# Patient Record
Sex: FEMALE | Race: WHITE | NOT HISPANIC OR LATINO | Employment: OTHER | URBAN - METROPOLITAN AREA
[De-identification: names, ages, dates, MRNs, and addresses within clinical notes are randomized per-mention and may not be internally consistent; named-entity substitution may affect disease eponyms.]

---

## 2021-08-18 DIAGNOSIS — Z11.59 ENCOUNTER FOR SCREENING FOR OTHER VIRAL DISEASES: ICD-10-CM

## 2021-08-26 ENCOUNTER — TRANSFERRED RECORDS (OUTPATIENT)
Dept: HEALTH INFORMATION MANAGEMENT | Facility: CLINIC | Age: 76
End: 2021-08-26

## 2021-08-26 LAB
CHOLESTEROL (EXTERNAL): 170 MG/DL (ref 0–199)
CREATININE (EXTERNAL): 0.79 MG/DL (ref 0.55–1.02)
GFR ESTIMATED (EXTERNAL): >60 ML/MIN/1.73M2
GLUCOSE (EXTERNAL): 100 MG/DL (ref 70–100)
HBA1C MFR BLD: 5.9 %
HDLC SERPL-MCNC: 66 MG/DL
LDL CHOLESTEROL (EXTERNAL): 85 MG/DL
NON HDL CHOLESTEROL (EXTERNAL): 104 MG/DL
POTASSIUM (EXTERNAL): 3.7 MMOL/L (ref 3.5–5.1)
TRIGLYCERIDES (EXTERNAL): 95 MG/DL

## 2021-08-30 ENCOUNTER — ANESTHESIA EVENT (OUTPATIENT)
Dept: SURGERY | Facility: CLINIC | Age: 76
DRG: 164 | End: 2021-08-30
Payer: COMMERCIAL

## 2021-08-30 RX ORDER — ATORVASTATIN CALCIUM 80 MG/1
80 TABLET, FILM COATED ORAL DAILY
COMMUNITY

## 2021-08-30 RX ORDER — ALBUTEROL SULFATE 90 UG/1
2 AEROSOL, METERED RESPIRATORY (INHALATION) EVERY 6 HOURS
COMMUNITY

## 2021-08-30 RX ORDER — TRAZODONE HYDROCHLORIDE 150 MG/1
150 TABLET ORAL AT BEDTIME
COMMUNITY

## 2021-08-30 RX ORDER — ALENDRONATE SODIUM 70 MG/1
70 TABLET ORAL
Status: ON HOLD | COMMUNITY
End: 2021-08-31

## 2021-08-30 RX ORDER — BUPROPION HYDROCHLORIDE 150 MG/1
150 TABLET ORAL EVERY MORNING
COMMUNITY

## 2021-08-30 ASSESSMENT — COPD QUESTIONNAIRES: COPD: 1

## 2021-08-30 ASSESSMENT — LIFESTYLE VARIABLES: TOBACCO_USE: 1

## 2021-08-31 ENCOUNTER — ANESTHESIA (OUTPATIENT)
Dept: SURGERY | Facility: CLINIC | Age: 76
DRG: 164 | End: 2021-08-31
Payer: COMMERCIAL

## 2021-08-31 ENCOUNTER — APPOINTMENT (OUTPATIENT)
Dept: GENERAL RADIOLOGY | Facility: CLINIC | Age: 76
DRG: 164 | End: 2021-08-31
Attending: THORACIC SURGERY (CARDIOTHORACIC VASCULAR SURGERY)
Payer: COMMERCIAL

## 2021-08-31 ENCOUNTER — HOSPITAL ENCOUNTER (INPATIENT)
Facility: CLINIC | Age: 76
LOS: 16 days | Discharge: HOME | DRG: 164 | End: 2021-09-16
Attending: THORACIC SURGERY (CARDIOTHORACIC VASCULAR SURGERY) | Admitting: THORACIC SURGERY (CARDIOTHORACIC VASCULAR SURGERY)
Payer: COMMERCIAL

## 2021-08-31 DIAGNOSIS — G89.18 ACUTE POST-OPERATIVE PAIN: Primary | ICD-10-CM

## 2021-08-31 DIAGNOSIS — K59.03 DRUG-INDUCED CONSTIPATION: ICD-10-CM

## 2021-08-31 DIAGNOSIS — D75.839 THROMBOCYTOSIS: ICD-10-CM

## 2021-08-31 PROBLEM — C34.31 PRIMARY ADENOCARCINOMA OF LOWER LOBE OF RIGHT LUNG (H): Status: ACTIVE | Noted: 2021-08-31

## 2021-08-31 LAB
ABO/RH(D): NORMAL
ALBUMIN SERPL-MCNC: 3.8 G/DL (ref 3.4–5)
ALP SERPL-CCNC: 22 U/L (ref 40–150)
ALT SERPL W P-5'-P-CCNC: 23 U/L (ref 0–50)
ANION GAP SERPL CALCULATED.3IONS-SCNC: 3 MMOL/L (ref 3–14)
ANION GAP SERPL CALCULATED.3IONS-SCNC: 5 MMOL/L (ref 3–14)
ANTIBODY SCREEN: NEGATIVE
AST SERPL W P-5'-P-CCNC: 18 U/L (ref 0–45)
BASOPHILS # BLD AUTO: 0.1 10E3/UL (ref 0–0.2)
BASOPHILS NFR BLD AUTO: 0 %
BILIRUB SERPL-MCNC: 0.3 MG/DL (ref 0.2–1.3)
BUN SERPL-MCNC: 18 MG/DL (ref 7–30)
BUN SERPL-MCNC: 21 MG/DL (ref 7–30)
CALCIUM SERPL-MCNC: 8.3 MG/DL (ref 8.5–10.1)
CALCIUM SERPL-MCNC: 8.6 MG/DL (ref 8.5–10.1)
CHLORIDE BLD-SCNC: 108 MMOL/L (ref 94–109)
CHLORIDE BLD-SCNC: 108 MMOL/L (ref 94–109)
CO2 SERPL-SCNC: 26 MMOL/L (ref 20–32)
CO2 SERPL-SCNC: 26 MMOL/L (ref 20–32)
CREAT SERPL-MCNC: 0.72 MG/DL (ref 0.52–1.04)
CREAT SERPL-MCNC: 0.78 MG/DL (ref 0.52–1.04)
CREAT SERPL-MCNC: 0.85 MG/DL (ref 0.52–1.04)
EOSINOPHIL # BLD AUTO: 0 10E3/UL (ref 0–0.7)
EOSINOPHIL NFR BLD AUTO: 0 %
ERYTHROCYTE [DISTWIDTH] IN BLOOD BY AUTOMATED COUNT: 13.9 % (ref 10–15)
GFR SERPL CREATININE-BSD FRML MDRD: 68 ML/MIN/1.73M2
GFR SERPL CREATININE-BSD FRML MDRD: 75 ML/MIN/1.73M2
GFR SERPL CREATININE-BSD FRML MDRD: 83 ML/MIN/1.73M2
GLUCOSE BLD-MCNC: 100 MG/DL (ref 70–99)
GLUCOSE BLD-MCNC: 130 MG/DL (ref 70–99)
HCT VFR BLD AUTO: 38.5 % (ref 35–47)
HCT VFR BLD AUTO: 42.6 % (ref 35–47)
HCT VFR BLD AUTO: 43 % (ref 35–47)
HGB BLD-MCNC: 12.8 G/DL (ref 11.7–15.7)
HGB BLD-MCNC: 13.9 G/DL (ref 11.7–15.7)
HGB BLD-MCNC: 14 G/DL (ref 11.7–15.7)
HGB BLD-MCNC: 14 G/DL (ref 11.7–15.7)
IMM GRANULOCYTES # BLD: 0.2 10E3/UL
IMM GRANULOCYTES NFR BLD: 1 %
INR PPP: 1.01 (ref 0.85–1.15)
LYMPHOCYTES # BLD AUTO: 0.6 10E3/UL (ref 0.8–5.3)
LYMPHOCYTES NFR BLD AUTO: 3 %
MCH RBC QN AUTO: 30.7 PG (ref 26.5–33)
MCH RBC QN AUTO: 30.8 PG (ref 26.5–33)
MCH RBC QN AUTO: 31.2 PG (ref 26.5–33)
MCHC RBC AUTO-ENTMCNC: 32.6 G/DL (ref 31.5–36.5)
MCHC RBC AUTO-ENTMCNC: 32.6 G/DL (ref 31.5–36.5)
MCHC RBC AUTO-ENTMCNC: 33.2 G/DL (ref 31.5–36.5)
MCV RBC AUTO: 94 FL (ref 78–100)
MONOCYTES # BLD AUTO: 1.2 10E3/UL (ref 0–1.3)
MONOCYTES NFR BLD AUTO: 5 %
NEUTROPHILS # BLD AUTO: 19.9 10E3/UL (ref 1.6–8.3)
NEUTROPHILS NFR BLD AUTO: 91 %
NRBC # BLD AUTO: 0 10E3/UL
NRBC BLD AUTO-RTO: 0 /100
PLATELET # BLD AUTO: 442 10E3/UL (ref 150–450)
PLATELET # BLD AUTO: 475 10E3/UL (ref 150–450)
PLATELET # BLD AUTO: 485 10E3/UL (ref 150–450)
POTASSIUM BLD-SCNC: 3.6 MMOL/L (ref 3.4–5.3)
POTASSIUM BLD-SCNC: 4 MMOL/L (ref 3.4–5.3)
PROT SERPL-MCNC: 6.5 G/DL (ref 6.8–8.8)
RBC # BLD AUTO: 4.1 10E6/UL (ref 3.8–5.2)
RBC # BLD AUTO: 4.52 10E6/UL (ref 3.8–5.2)
RBC # BLD AUTO: 4.56 10E6/UL (ref 3.8–5.2)
SODIUM SERPL-SCNC: 137 MMOL/L (ref 133–144)
SODIUM SERPL-SCNC: 139 MMOL/L (ref 133–144)
SPECIMEN EXPIRATION DATE: NORMAL
WBC # BLD AUTO: 21.9 10E3/UL (ref 4–11)
WBC # BLD AUTO: 8.1 10E3/UL (ref 4–11)
WBC # BLD AUTO: ABNORMAL 10*3/UL

## 2021-08-31 PROCEDURE — 258N000003 HC RX IP 258 OP 636: Performed by: SURGERY

## 2021-08-31 PROCEDURE — 36415 COLL VENOUS BLD VENIPUNCTURE: CPT | Performed by: PHYSICIAN ASSISTANT

## 2021-08-31 PROCEDURE — 250N000009 HC RX 250: Performed by: SURGERY

## 2021-08-31 PROCEDURE — 250N000025 HC SEVOFLURANE, PER MIN: Performed by: THORACIC SURGERY (CARDIOTHORACIC VASCULAR SURGERY)

## 2021-08-31 PROCEDURE — 258N000003 HC RX IP 258 OP 636: Performed by: PHYSICIAN ASSISTANT

## 2021-08-31 PROCEDURE — 360N000077 HC SURGERY LEVEL 4, PER MIN: Performed by: THORACIC SURGERY (CARDIOTHORACIC VASCULAR SURGERY)

## 2021-08-31 PROCEDURE — 82565 ASSAY OF CREATININE: CPT | Performed by: PHYSICIAN ASSISTANT

## 2021-08-31 PROCEDURE — 99207 PR CONSULT E&M CHANGED TO INITIAL LEVEL: CPT | Performed by: PHYSICIAN ASSISTANT

## 2021-08-31 PROCEDURE — 999N000063 XR CHEST PORT 1 VIEW

## 2021-08-31 PROCEDURE — 258N000003 HC RX IP 258 OP 636: Performed by: ANESTHESIOLOGY

## 2021-08-31 PROCEDURE — 250N000009 HC RX 250: Performed by: ANESTHESIOLOGY

## 2021-08-31 PROCEDURE — 85025 COMPLETE CBC W/AUTO DIFF WBC: CPT | Performed by: THORACIC SURGERY (CARDIOTHORACIC VASCULAR SURGERY)

## 2021-08-31 PROCEDURE — 250N000011 HC RX IP 250 OP 636: Performed by: PHYSICIAN ASSISTANT

## 2021-08-31 PROCEDURE — 250N000013 HC RX MED GY IP 250 OP 250 PS 637: Performed by: PHYSICIAN ASSISTANT

## 2021-08-31 PROCEDURE — 999N000141 HC STATISTIC PRE-PROCEDURE NURSING ASSESSMENT: Performed by: THORACIC SURGERY (CARDIOTHORACIC VASCULAR SURGERY)

## 2021-08-31 PROCEDURE — 07B70ZX EXCISION OF THORAX LYMPHATIC, OPEN APPROACH, DIAGNOSTIC: ICD-10-PCS | Performed by: THORACIC SURGERY (CARDIOTHORACIC VASCULAR SURGERY)

## 2021-08-31 PROCEDURE — 36415 COLL VENOUS BLD VENIPUNCTURE: CPT | Performed by: THORACIC SURGERY (CARDIOTHORACIC VASCULAR SURGERY)

## 2021-08-31 PROCEDURE — 85018 HEMOGLOBIN: CPT | Performed by: PHYSICIAN ASSISTANT

## 2021-08-31 PROCEDURE — 99222 1ST HOSP IP/OBS MODERATE 55: CPT | Performed by: PHYSICIAN ASSISTANT

## 2021-08-31 PROCEDURE — 710N000009 HC RECOVERY PHASE 1, LEVEL 1, PER MIN: Performed by: THORACIC SURGERY (CARDIOTHORACIC VASCULAR SURGERY)

## 2021-08-31 PROCEDURE — 120N000001 HC R&B MED SURG/OB

## 2021-08-31 PROCEDURE — 250N000011 HC RX IP 250 OP 636: Performed by: SURGERY

## 2021-08-31 PROCEDURE — 85610 PROTHROMBIN TIME: CPT | Performed by: THORACIC SURGERY (CARDIOTHORACIC VASCULAR SURGERY)

## 2021-08-31 PROCEDURE — 250N000011 HC RX IP 250 OP 636: Performed by: THORACIC SURGERY (CARDIOTHORACIC VASCULAR SURGERY)

## 2021-08-31 PROCEDURE — 80048 BASIC METABOLIC PNL TOTAL CA: CPT | Performed by: THORACIC SURGERY (CARDIOTHORACIC VASCULAR SURGERY)

## 2021-08-31 PROCEDURE — 250N000013 HC RX MED GY IP 250 OP 250 PS 637

## 2021-08-31 PROCEDURE — 86900 BLOOD TYPING SEROLOGIC ABO: CPT | Performed by: ANESTHESIOLOGY

## 2021-08-31 PROCEDURE — 88309 TISSUE EXAM BY PATHOLOGIST: CPT | Mod: TC | Performed by: THORACIC SURGERY (CARDIOTHORACIC VASCULAR SURGERY)

## 2021-08-31 PROCEDURE — 278N000051 HC OR IMPLANT GENERAL: Performed by: THORACIC SURGERY (CARDIOTHORACIC VASCULAR SURGERY)

## 2021-08-31 PROCEDURE — 85049 AUTOMATED PLATELET COUNT: CPT | Performed by: THORACIC SURGERY (CARDIOTHORACIC VASCULAR SURGERY)

## 2021-08-31 PROCEDURE — XW0DXT5 INTRODUCTION OF RUXOLITINIB INTO MOUTH AND PHARYNX, EXTERNAL APPROACH, NEW TECHNOLOGY GROUP 5: ICD-10-PCS | Performed by: THORACIC SURGERY (CARDIOTHORACIC VASCULAR SURGERY)

## 2021-08-31 PROCEDURE — 370N000017 HC ANESTHESIA TECHNICAL FEE, PER MIN: Performed by: THORACIC SURGERY (CARDIOTHORACIC VASCULAR SURGERY)

## 2021-08-31 PROCEDURE — 272N000001 HC OR GENERAL SUPPLY STERILE: Performed by: THORACIC SURGERY (CARDIOTHORACIC VASCULAR SURGERY)

## 2021-08-31 PROCEDURE — 250N000011 HC RX IP 250 OP 636

## 2021-08-31 PROCEDURE — 0BTF0ZZ RESECTION OF RIGHT LOWER LUNG LOBE, OPEN APPROACH: ICD-10-PCS | Performed by: THORACIC SURGERY (CARDIOTHORACIC VASCULAR SURGERY)

## 2021-08-31 PROCEDURE — 82565 ASSAY OF CREATININE: CPT | Performed by: THORACIC SURGERY (CARDIOTHORACIC VASCULAR SURGERY)

## 2021-08-31 PROCEDURE — 250N000009 HC RX 250: Performed by: THORACIC SURGERY (CARDIOTHORACIC VASCULAR SURGERY)

## 2021-08-31 PROCEDURE — 85041 AUTOMATED RBC COUNT: CPT | Performed by: THORACIC SURGERY (CARDIOTHORACIC VASCULAR SURGERY)

## 2021-08-31 PROCEDURE — 271N000002 HC RX 271: Performed by: THORACIC SURGERY (CARDIOTHORACIC VASCULAR SURGERY)

## 2021-08-31 DEVICE — SEALANT PLEURAL AIRLEAK PROGEL 4ML PGPS002: Type: IMPLANTABLE DEVICE | Site: LUNG | Status: FUNCTIONAL

## 2021-08-31 RX ORDER — DIPHENHYDRAMINE HYDROCHLORIDE 50 MG/ML
12.5 INJECTION INTRAMUSCULAR; INTRAVENOUS EVERY 6 HOURS PRN
Status: DISCONTINUED | OUTPATIENT
Start: 2021-08-31 | End: 2021-09-16 | Stop reason: HOSPADM

## 2021-08-31 RX ORDER — AMOXICILLIN 250 MG
1 CAPSULE ORAL 2 TIMES DAILY
Status: DISCONTINUED | OUTPATIENT
Start: 2021-08-31 | End: 2021-09-14

## 2021-08-31 RX ORDER — ONDANSETRON 4 MG/1
4 TABLET, ORALLY DISINTEGRATING ORAL EVERY 6 HOURS PRN
Status: DISCONTINUED | OUTPATIENT
Start: 2021-08-31 | End: 2021-09-16 | Stop reason: HOSPADM

## 2021-08-31 RX ORDER — LABETALOL HYDROCHLORIDE 5 MG/ML
10 INJECTION, SOLUTION INTRAVENOUS
Status: DISCONTINUED | OUTPATIENT
Start: 2021-08-31 | End: 2021-08-31 | Stop reason: HOSPADM

## 2021-08-31 RX ORDER — POLYETHYLENE GLYCOL 3350 17 G/17G
17 POWDER, FOR SOLUTION ORAL DAILY
Status: DISCONTINUED | OUTPATIENT
Start: 2021-09-01 | End: 2021-09-16 | Stop reason: HOSPADM

## 2021-08-31 RX ORDER — CALCIUM CARBONATE 500 MG/1
500 TABLET, CHEWABLE ORAL 4 TIMES DAILY PRN
Status: DISCONTINUED | OUTPATIENT
Start: 2021-08-31 | End: 2021-09-01

## 2021-08-31 RX ORDER — ACETAMINOPHEN 325 MG/1
650 TABLET ORAL EVERY 4 HOURS PRN
Status: DISCONTINUED | OUTPATIENT
Start: 2021-09-03 | End: 2021-09-01

## 2021-08-31 RX ORDER — SODIUM CHLORIDE, SODIUM LACTATE, POTASSIUM CHLORIDE, CALCIUM CHLORIDE 600; 310; 30; 20 MG/100ML; MG/100ML; MG/100ML; MG/100ML
INJECTION, SOLUTION INTRAVENOUS CONTINUOUS
Status: DISCONTINUED | OUTPATIENT
Start: 2021-08-31 | End: 2021-08-31 | Stop reason: HOSPADM

## 2021-08-31 RX ORDER — PROPOFOL 10 MG/ML
INJECTION, EMULSION INTRAVENOUS PRN
Status: DISCONTINUED | OUTPATIENT
Start: 2021-08-31 | End: 2021-08-31

## 2021-08-31 RX ORDER — LIDOCAINE 40 MG/G
CREAM TOPICAL
Status: DISCONTINUED | OUTPATIENT
Start: 2021-08-31 | End: 2021-09-02

## 2021-08-31 RX ORDER — ALBUTEROL SULFATE 90 UG/1
2 AEROSOL, METERED RESPIRATORY (INHALATION) EVERY 6 HOURS PRN
Status: DISCONTINUED | OUTPATIENT
Start: 2021-08-31 | End: 2021-09-16 | Stop reason: HOSPADM

## 2021-08-31 RX ORDER — LIDOCAINE HYDROCHLORIDE 20 MG/ML
INJECTION, SOLUTION INFILTRATION; PERINEURAL PRN
Status: DISCONTINUED | OUTPATIENT
Start: 2021-08-31 | End: 2021-08-31

## 2021-08-31 RX ORDER — PROPOFOL 10 MG/ML
INJECTION, EMULSION INTRAVENOUS CONTINUOUS PRN
Status: DISCONTINUED | OUTPATIENT
Start: 2021-08-31 | End: 2021-08-31

## 2021-08-31 RX ORDER — EPHEDRINE SULFATE 50 MG/ML
INJECTION, SOLUTION INTRAMUSCULAR; INTRAVENOUS; SUBCUTANEOUS PRN
Status: DISCONTINUED | OUTPATIENT
Start: 2021-08-31 | End: 2021-08-31

## 2021-08-31 RX ORDER — NALOXONE HYDROCHLORIDE 0.4 MG/ML
0.4 INJECTION, SOLUTION INTRAMUSCULAR; INTRAVENOUS; SUBCUTANEOUS
Status: DISCONTINUED | OUTPATIENT
Start: 2021-08-31 | End: 2021-09-16 | Stop reason: HOSPADM

## 2021-08-31 RX ORDER — OXYCODONE HYDROCHLORIDE 5 MG/1
5 TABLET ORAL EVERY 4 HOURS PRN
Status: DISCONTINUED | OUTPATIENT
Start: 2021-08-31 | End: 2021-08-31 | Stop reason: HOSPADM

## 2021-08-31 RX ORDER — GINSENG 100 MG
CAPSULE ORAL DAILY
Status: DISCONTINUED | OUTPATIENT
Start: 2021-08-31 | End: 2021-09-16 | Stop reason: HOSPADM

## 2021-08-31 RX ORDER — HYDRALAZINE HYDROCHLORIDE 20 MG/ML
2.5-5 INJECTION INTRAMUSCULAR; INTRAVENOUS EVERY 10 MIN PRN
Status: DISCONTINUED | OUTPATIENT
Start: 2021-08-31 | End: 2021-08-31 | Stop reason: HOSPADM

## 2021-08-31 RX ORDER — ACETAMINOPHEN 325 MG/1
975 TABLET ORAL EVERY 8 HOURS
Status: DISCONTINUED | OUTPATIENT
Start: 2021-08-31 | End: 2021-09-01

## 2021-08-31 RX ORDER — DIPHENHYDRAMINE HCL 12.5MG/5ML
12.5 LIQUID (ML) ORAL EVERY 6 HOURS PRN
Status: DISCONTINUED | OUTPATIENT
Start: 2021-08-31 | End: 2021-09-16 | Stop reason: HOSPADM

## 2021-08-31 RX ORDER — LIDOCAINE 40 MG/G
CREAM TOPICAL
Status: DISCONTINUED | OUTPATIENT
Start: 2021-08-31 | End: 2021-08-31

## 2021-08-31 RX ORDER — PROCHLORPERAZINE MALEATE 5 MG
5 TABLET ORAL EVERY 6 HOURS PRN
Status: DISCONTINUED | OUTPATIENT
Start: 2021-08-31 | End: 2021-09-16 | Stop reason: HOSPADM

## 2021-08-31 RX ORDER — ATORVASTATIN CALCIUM 40 MG/1
80 TABLET, FILM COATED ORAL EVERY EVENING
Status: DISCONTINUED | OUTPATIENT
Start: 2021-08-31 | End: 2021-09-16 | Stop reason: HOSPADM

## 2021-08-31 RX ORDER — ONDANSETRON 2 MG/ML
4 INJECTION INTRAMUSCULAR; INTRAVENOUS EVERY 30 MIN PRN
Status: DISCONTINUED | OUTPATIENT
Start: 2021-08-31 | End: 2021-08-31 | Stop reason: HOSPADM

## 2021-08-31 RX ORDER — NALOXONE HYDROCHLORIDE 0.4 MG/ML
0.2 INJECTION, SOLUTION INTRAMUSCULAR; INTRAVENOUS; SUBCUTANEOUS
Status: DISCONTINUED | OUTPATIENT
Start: 2021-08-31 | End: 2021-09-16 | Stop reason: HOSPADM

## 2021-08-31 RX ORDER — BUPIVACAINE HYDROCHLORIDE 5 MG/ML
INJECTION, SOLUTION PERINEURAL PRN
Status: DISCONTINUED | OUTPATIENT
Start: 2021-08-31 | End: 2021-08-31 | Stop reason: HOSPADM

## 2021-08-31 RX ORDER — ONDANSETRON 2 MG/ML
4 INJECTION INTRAMUSCULAR; INTRAVENOUS EVERY 6 HOURS PRN
Status: DISCONTINUED | OUTPATIENT
Start: 2021-08-31 | End: 2021-09-16 | Stop reason: HOSPADM

## 2021-08-31 RX ORDER — FENTANYL CITRATE 50 UG/ML
25 INJECTION, SOLUTION INTRAMUSCULAR; INTRAVENOUS EVERY 5 MIN PRN
Status: DISCONTINUED | OUTPATIENT
Start: 2021-08-31 | End: 2021-08-31 | Stop reason: HOSPADM

## 2021-08-31 RX ORDER — DEXAMETHASONE SODIUM PHOSPHATE 4 MG/ML
INJECTION, SOLUTION INTRA-ARTICULAR; INTRALESIONAL; INTRAMUSCULAR; INTRAVENOUS; SOFT TISSUE PRN
Status: DISCONTINUED | OUTPATIENT
Start: 2021-08-31 | End: 2021-08-31

## 2021-08-31 RX ORDER — CALCIUM CARBONATE 500 MG/1
500 TABLET, CHEWABLE ORAL 4 TIMES DAILY PRN
Status: DISCONTINUED | OUTPATIENT
Start: 2021-08-31 | End: 2021-09-16 | Stop reason: HOSPADM

## 2021-08-31 RX ORDER — HYDROMORPHONE HCL IN WATER/PF 6 MG/30 ML
0.4 PATIENT CONTROLLED ANALGESIA SYRINGE INTRAVENOUS EVERY 5 MIN PRN
Status: DISCONTINUED | OUTPATIENT
Start: 2021-08-31 | End: 2021-08-31 | Stop reason: HOSPADM

## 2021-08-31 RX ORDER — FAMOTIDINE 20 MG/1
20 TABLET, FILM COATED ORAL 2 TIMES DAILY
Status: DISCONTINUED | OUTPATIENT
Start: 2021-08-31 | End: 2021-09-02

## 2021-08-31 RX ORDER — CEFAZOLIN SODIUM 2 G/100ML
2 INJECTION, SOLUTION INTRAVENOUS SEE ADMIN INSTRUCTIONS
Status: DISCONTINUED | OUTPATIENT
Start: 2021-08-31 | End: 2021-08-31 | Stop reason: HOSPADM

## 2021-08-31 RX ORDER — NITROGLYCERIN 0.4 MG/1
0.4 TABLET SUBLINGUAL EVERY 5 MIN PRN
Status: DISCONTINUED | OUTPATIENT
Start: 2021-08-31 | End: 2021-09-02

## 2021-08-31 RX ORDER — BISACODYL 10 MG
10 SUPPOSITORY, RECTAL RECTAL DAILY PRN
Status: DISCONTINUED | OUTPATIENT
Start: 2021-08-31 | End: 2021-09-16 | Stop reason: HOSPADM

## 2021-08-31 RX ORDER — ONDANSETRON 2 MG/ML
INJECTION INTRAMUSCULAR; INTRAVENOUS PRN
Status: DISCONTINUED | OUTPATIENT
Start: 2021-08-31 | End: 2021-08-31

## 2021-08-31 RX ORDER — ALBUTEROL SULFATE 90 UG/1
AEROSOL, METERED RESPIRATORY (INHALATION) PRN
Status: DISCONTINUED | OUTPATIENT
Start: 2021-08-31 | End: 2021-08-31

## 2021-08-31 RX ORDER — MAGNESIUM HYDROXIDE 1200 MG/15ML
LIQUID ORAL PRN
Status: DISCONTINUED | OUTPATIENT
Start: 2021-08-31 | End: 2021-08-31 | Stop reason: HOSPADM

## 2021-08-31 RX ORDER — ONDANSETRON 4 MG/1
4 TABLET, ORALLY DISINTEGRATING ORAL EVERY 30 MIN PRN
Status: DISCONTINUED | OUTPATIENT
Start: 2021-08-31 | End: 2021-08-31 | Stop reason: HOSPADM

## 2021-08-31 RX ORDER — FENTANYL CITRATE 50 UG/ML
INJECTION, SOLUTION INTRAMUSCULAR; INTRAVENOUS PRN
Status: DISCONTINUED | OUTPATIENT
Start: 2021-08-31 | End: 2021-08-31

## 2021-08-31 RX ORDER — CEFAZOLIN SODIUM 2 G/100ML
2 INJECTION, SOLUTION INTRAVENOUS
Status: DISCONTINUED | OUTPATIENT
Start: 2021-08-31 | End: 2021-08-31 | Stop reason: HOSPADM

## 2021-08-31 RX ORDER — KETOROLAC TROMETHAMINE 30 MG/ML
INJECTION, SOLUTION INTRAMUSCULAR; INTRAVENOUS PRN
Status: DISCONTINUED | OUTPATIENT
Start: 2021-08-31 | End: 2021-08-31

## 2021-08-31 RX ORDER — HYDROMORPHONE HCL IN WATER/PF 6 MG/30 ML
.2-.3 PATIENT CONTROLLED ANALGESIA SYRINGE INTRAVENOUS
Status: DISCONTINUED | OUTPATIENT
Start: 2021-08-31 | End: 2021-09-01

## 2021-08-31 RX ORDER — SODIUM CHLORIDE 9 MG/ML
INJECTION, SOLUTION INTRAVENOUS CONTINUOUS
Status: DISCONTINUED | OUTPATIENT
Start: 2021-08-31 | End: 2021-09-02

## 2021-08-31 RX ORDER — BUPROPION HYDROCHLORIDE 150 MG/1
150 TABLET ORAL EVERY MORNING
Status: DISCONTINUED | OUTPATIENT
Start: 2021-08-31 | End: 2021-09-16 | Stop reason: HOSPADM

## 2021-08-31 RX ORDER — KETOROLAC TROMETHAMINE 15 MG/ML
15 INJECTION, SOLUTION INTRAMUSCULAR; INTRAVENOUS EVERY 6 HOURS
Status: COMPLETED | OUTPATIENT
Start: 2021-08-31 | End: 2021-09-03

## 2021-08-31 RX ORDER — HYDROMORPHONE HYDROCHLORIDE 2 MG/1
2 TABLET ORAL
Status: DISCONTINUED | OUTPATIENT
Start: 2021-08-31 | End: 2021-09-01

## 2021-08-31 RX ADMIN — KETOROLAC TROMETHAMINE 15 MG: 15 INJECTION, SOLUTION INTRAMUSCULAR; INTRAVENOUS at 14:14

## 2021-08-31 RX ADMIN — FAMOTIDINE 20 MG: 20 TABLET ORAL at 20:25

## 2021-08-31 RX ADMIN — UMECLIDINIUM BROMIDE AND VILANTEROL TRIFENATATE 1 PUFF: 62.5; 25 POWDER RESPIRATORY (INHALATION) at 14:14

## 2021-08-31 RX ADMIN — HYDROMORPHONE HYDROCHLORIDE 0.2 MG: 0.2 INJECTION, SOLUTION INTRAMUSCULAR; INTRAVENOUS; SUBCUTANEOUS at 13:11

## 2021-08-31 RX ADMIN — HYDROMORPHONE HYDROCHLORIDE 2 MG: 2 TABLET ORAL at 22:55

## 2021-08-31 RX ADMIN — DEXAMETHASONE SODIUM PHOSPHATE 4 MG: 4 INJECTION, SOLUTION INTRA-ARTICULAR; INTRALESIONAL; INTRAMUSCULAR; INTRAVENOUS; SOFT TISSUE at 08:26

## 2021-08-31 RX ADMIN — ALBUTEROL SULFATE 2 PUFF: 90 AEROSOL, METERED RESPIRATORY (INHALATION) at 07:21

## 2021-08-31 RX ADMIN — FENTANYL CITRATE 25 MCG: 50 INJECTION, SOLUTION INTRAMUSCULAR; INTRAVENOUS at 09:44

## 2021-08-31 RX ADMIN — SODIUM CHLORIDE, POTASSIUM CHLORIDE, SODIUM LACTATE AND CALCIUM CHLORIDE: 600; 310; 30; 20 INJECTION, SOLUTION INTRAVENOUS at 07:26

## 2021-08-31 RX ADMIN — ONDANSETRON 4 MG: 2 INJECTION INTRAMUSCULAR; INTRAVENOUS at 09:16

## 2021-08-31 RX ADMIN — FENTANYL CITRATE 50 MCG: 50 INJECTION, SOLUTION INTRAMUSCULAR; INTRAVENOUS at 08:29

## 2021-08-31 RX ADMIN — FENTANYL CITRATE 50 MCG: 50 INJECTION, SOLUTION INTRAMUSCULAR; INTRAVENOUS at 08:05

## 2021-08-31 RX ADMIN — Medication 2.5 MG: at 09:06

## 2021-08-31 RX ADMIN — ROCURONIUM BROMIDE 50 MG: 10 INJECTION INTRAVENOUS at 07:57

## 2021-08-31 RX ADMIN — MIDAZOLAM 2 MG: 1 INJECTION INTRAMUSCULAR; INTRAVENOUS at 07:47

## 2021-08-31 RX ADMIN — SODIUM CHLORIDE, POTASSIUM CHLORIDE, SODIUM LACTATE AND CALCIUM CHLORIDE: 600; 310; 30; 20 INJECTION, SOLUTION INTRAVENOUS at 10:12

## 2021-08-31 RX ADMIN — Medication: at 10:13

## 2021-08-31 RX ADMIN — LIDOCAINE HYDROCHLORIDE 1 ML: 10 INJECTION, SOLUTION EPIDURAL; INFILTRATION; INTRACAUDAL; PERINEURAL at 07:27

## 2021-08-31 RX ADMIN — ACETAMINOPHEN 975 MG: 325 TABLET, FILM COATED ORAL at 20:25

## 2021-08-31 RX ADMIN — KETOROLAC TROMETHAMINE 15 MG: 30 INJECTION, SOLUTION INTRAMUSCULAR at 09:23

## 2021-08-31 RX ADMIN — FENTANYL CITRATE 25 MCG: 50 INJECTION, SOLUTION INTRAMUSCULAR; INTRAVENOUS at 09:51

## 2021-08-31 RX ADMIN — KETOROLAC TROMETHAMINE 15 MG: 15 INJECTION, SOLUTION INTRAMUSCULAR; INTRAVENOUS at 20:24

## 2021-08-31 RX ADMIN — ATORVASTATIN CALCIUM 80 MG: 40 TABLET, FILM COATED ORAL at 20:25

## 2021-08-31 RX ADMIN — Medication 2.5 MG: at 09:20

## 2021-08-31 RX ADMIN — HYDROMORPHONE HYDROCHLORIDE 0.2 MG: 0.2 INJECTION, SOLUTION INTRAMUSCULAR; INTRAVENOUS; SUBCUTANEOUS at 22:15

## 2021-08-31 RX ADMIN — HYDROMORPHONE HYDROCHLORIDE 0.4 MG: 0.2 INJECTION, SOLUTION INTRAMUSCULAR; INTRAVENOUS; SUBCUTANEOUS at 09:59

## 2021-08-31 RX ADMIN — LIDOCAINE HYDROCHLORIDE 80 MG: 20 INJECTION, SOLUTION INFILTRATION; PERINEURAL at 07:57

## 2021-08-31 RX ADMIN — HYDROMORPHONE HYDROCHLORIDE 1 MG: 2 TABLET ORAL at 16:06

## 2021-08-31 RX ADMIN — FENTANYL CITRATE 50 MCG: 50 INJECTION, SOLUTION INTRAMUSCULAR; INTRAVENOUS at 07:57

## 2021-08-31 RX ADMIN — BUPROPION HYDROCHLORIDE 150 MG: 150 TABLET, FILM COATED, EXTENDED RELEASE ORAL at 13:11

## 2021-08-31 RX ADMIN — CEFAZOLIN SODIUM 2 G: 2 INJECTION, SOLUTION INTRAVENOUS at 08:00

## 2021-08-31 RX ADMIN — SENNOSIDES AND DOCUSATE SODIUM 1 TABLET: 8.6; 5 TABLET ORAL at 20:26

## 2021-08-31 RX ADMIN — HYDROMORPHONE HYDROCHLORIDE 0.4 MG: 0.2 INJECTION, SOLUTION INTRAMUSCULAR; INTRAVENOUS; SUBCUTANEOUS at 10:20

## 2021-08-31 RX ADMIN — FENTANYL CITRATE 50 MCG: 50 INJECTION, SOLUTION INTRAMUSCULAR; INTRAVENOUS at 08:50

## 2021-08-31 RX ADMIN — SUGAMMADEX 100 MG: 100 INJECTION, SOLUTION INTRAVENOUS at 09:28

## 2021-08-31 RX ADMIN — PROPOFOL 30 MCG/KG/MIN: 10 INJECTION, EMULSION INTRAVENOUS at 08:19

## 2021-08-31 RX ADMIN — PROPOFOL 100 MG: 10 INJECTION, EMULSION INTRAVENOUS at 07:57

## 2021-08-31 RX ADMIN — ACETAMINOPHEN 975 MG: 325 TABLET, FILM COATED ORAL at 13:11

## 2021-08-31 RX ADMIN — SODIUM CHLORIDE: 9 INJECTION, SOLUTION INTRAVENOUS at 12:28

## 2021-08-31 ASSESSMENT — ACTIVITIES OF DAILY LIVING (ADL)
DOING_ERRANDS_INDEPENDENTLY_DIFFICULTY: NO
ADLS_ACUITY_SCORE: 15
ADLS_ACUITY_SCORE: 16
ADLS_ACUITY_SCORE: 17

## 2021-08-31 ASSESSMENT — MIFFLIN-ST. JEOR
SCORE: 1495.56
SCORE: 904.35

## 2021-08-31 NOTE — ANESTHESIA PROCEDURE NOTES
Airway       Patient location during procedure: OR       Procedure Start/Stop Times: 8/31/2021 8:00 AM  Staff -        CRNA: Jer Helton APRN CRNA       Other Anesthesia Staff: Ramona Ribeiro       Performed By: JOSE and with CRNAs       Procedure performed by resident/fellow/CRNA in presence of a teaching physician.    Consent for Airway        Urgency: elective  Indications and Patient Condition       Indications for airway management: michelle-procedural         Mask difficulty assessment: 1 - vent by mask    Final Airway Details       Final airway type: endotracheal airway       Successful airway: ETT - double lumen left  Endotracheal Airway Details        Cuffed: yes       Successful intubation technique: direct laryngoscopy       DL Blade Type: Zazueta 2       Grade View of Cords: 1       Adjucts: stylet       Position: Right       Measured from: gums/teeth (plascement verified by Dr. Wilcox with FOB)       ETT Double lumen (fr): 35    Post intubation assessment        Placement verified by: capnometry, equal breath sounds and chest rise        Number of attempts at approach: 1       Number of other approaches attempted: 0       Secured with: pink tape       Ease of procedure: easy       Dentition: Intact and Unchanged

## 2021-08-31 NOTE — PLAN OF CARE
IMC. Pt A&Ox 4 but forgetful. CMS intact. VSS on RA. Lungs coarse. 1 chest tube in place and patent. Sanguinous output. Continues air leak on chest tube. MD is aware.  Up with 1. Taking scheduled Toradol, tylenol and PRN dilaudid for pain. BS audible. Regular diet. Voiding bedside command. Continue to monitor.

## 2021-08-31 NOTE — PROGRESS NOTES
PTA medications completed by Medication Scribe day of surgery     Medication history sources: Patient and H&P  In the past week, patient estimated taking medication this percent of the time: Greater than 90%  Adherence assessment: N/A Not Observed    Significant changes made to the medication list:  Patient reports no longer taking the following meds (med scribe removed from PTA med list): Alendronate      Additional medication history information:   Patient brought own home meds: Ruxolitinib    Medication reconciliation completed by provider prior to medication history? No    Time spent in this activity: 20 minutes    The information provided in this note is only as accurate as the sources available at the time of update(s)      Prior to Admission medications    Medication Sig Last Dose Taking? Auth Provider   albuterol (PROAIR HFA/PROVENTIL HFA/VENTOLIN HFA) 108 (90 Base) MCG/ACT inhaler Inhale 2 puffs into the lungs every 6 hours  at PRN Yes Reported, Patient   atorvastatin (LIPITOR) 80 MG tablet Take 80 mg by mouth daily 8/30/2021 at PM Yes Reported, Patient   buPROPion (WELLBUTRIN XL) 150 MG 24 hr tablet Take 150 mg by mouth every morning 8/30/2021 at AM Yes Reported, Patient   nicotine (NICORETTE) 2 MG gum Place 2 mg inside cheek as needed for smoking cessation  at PRN Yes Reported, Patient   ruxolitinib 10 MG TABS tablet Take 10 mg by mouth 2 times daily  8/30/2021 at PM Yes Reported, Patient   traZODone (DESYREL) 150 MG tablet Take 150 mg by mouth At Bedtime 8/30/2021 at PM Yes Reported, Patient   umeclidinium-vilanterol (ANORO ELLIPTA) 62.5-25 MCG/INH oral inhaler Inhale 1 puff into the lungs daily 8/30/2021 at AM Yes Reported, Patient       Medication history completed by:    Lonny Ambrocio CPhT  Medication Scribe  [facility]

## 2021-08-31 NOTE — INTERVAL H&P NOTE
I have reviewed the surgical (or preoperative) H&P that is linked to this encounter, and examined the patient. There are no significant changes  
No

## 2021-08-31 NOTE — OP NOTE
Procedure Date: 08/31/2021    SURGEON:  Meño Wilcox MD    FIRST ASSISTANT:  Ysabel Dockery PA-C    PREOPERATIVE DIAGNOSIS:  Adenocarcinoma, right lower lobe lung.    POSTOPERATIVE DIAGNOSIS:  Adenocarcinoma, right lower lobe lung.    PROCEDURE:  Limited right thoracotomy, right lower lobectomy with mediastinal lymph node dissection.    ANESTHESIA:  General with double-lumen endotracheal tube.    INDICATIONS:  A 76-year-old woman was found to have an enlarging nodule.  PET scan did not show evidence of mayur disease or distant disease.  CT-guided biopsy was positive for adenocarcinoma.  Clinically, this is stage I.  MRI of the brain was negative and her pulmonary function tests are adequate.  Based on the findings, a resection is indicated for treatment.    DESCRIPTION OF PROCEDURE:  The patient was brought and placed in supine position under general anesthesia with a double-lumen endotracheal tube, the patient was placed in the left decubitus position.  The right chest was prepared and draped in sterile fashion, ChloraPrep and ventilation of the right lung was continued.  A small posterolateral thoracotomy was made, sparing the serratus anterior muscle.  Pleural space entered in the 5th intercostal space, preserving the integrity of the rib.  On examination, there is no pleural fluid or pleural nodule.  Diaphragm hilum and mediastinum are normal.  Careful palpation of the lung was done.  Only abnormality is a known cancer in the right lower lobe lung.  The inferior pulmonary ligament was mobilized.  The hilum was dissected circumferentially.  The mediastinal pleura above the azygos vein was incised.  Mediastinal lymph node dissection was performed excising the inferior pulmonary ligament lymph node, subcarinal lymph node, right paratracheal lymph node, 2 separate anterior lobar lymph node and the right lower bronchus lymph node.  These were all submitted for permanent section.  Then, the fissure was  entered, exposing the pulmonary artery.  A fissure was completed anteriorly and posteriorly with application of [place] 60 gold stapling device.  Pulmonary artery was exposed.  The branch of the pulmonary artery to the superior segment was dissected circumferentially and staple application of [place] 35 mm vascular stapling device.  Then, the basilar trunk was dissected circumferentially and stapled similar fashion.  Then, the inferior pulmonary vein was dissected circumferentially and staple application of [place] 35 vascular stapling device.  Then, the origin of the right lower lobe bronchus was dissected and stapled with application of [place] 60 mm gold stapling device just beyond its origin avoiding any narrowing of the middle bronchus.  Bronchial stump was airtight at a pressure of 20 cm water.  An excellent reexpansion of the upper and middle lobe.  Some Progel was placed on the fissure.  Hemostasis was verified and was excellent.  Through a separate stab wound, a 28 straight chest tube was placed with tip directed apex posteriorly and sutured to skin with 2-0 silk suture.  On-Q catheters were placed with 25 mL of Marcaine 0.5% without epinephrine was injected as intercostal blocks.  The incision was closed with pericostal suture Vicryl #1 running #1 Vicryl muscular layer running 2-0 Vicryl for subcutaneous tissue and skin closed with Insorb staples.    ESTIMATED BLOOD LOSS:  5 mL    COUNTS:  Sponge count is correct.    Ysabel Dockery PA-C, was the first assistant during the procedure.  Her role as first assistant was essential and necessary in accomplishing the steps of the procedure as described above, providing exposure, retraction.    Meño Wilcox MD        D: 2021   T: 2021   MT: [id number]    Name:     DONALD AUGUSTINE  MRN:      [id number]        Account:        [id number]   :      1945           Procedure Date: 2021     Document: [id number]

## 2021-08-31 NOTE — CONSULTS
[facility]  Consult Note - Hospitalist Service     Date of Admission:  8/31/2021  Consult Requested by: Ysabel Dockery PA-C  Reason for Consult: Post-operative medical co-management.     Assessment & Plan   Margret Alves is a 76 year old female with PMHx of tobacco use disorder, COPD, insomnia, MDD, polycythemia vera, impaired fasting glucose, and adenocarcinoma RLL admitted on 8/31/2021 and underwent limited right thoracotomy, RL lobectomy with mediastinal LN dissection. Hospitalist service was consulted for medical co-management.     Adenocarcinoma RLL s/p limited right thoracotomy, RL lobectomy with mediastinal LN dissection: Surgery performed 8/31/21 by Dr. Wilcox.   -- Defer routine post-operative cares, IVF, DVT prophylaxis and pain control to primary service   -- Encourage pulmonary toilet; incentive spirometer at bedside   -- Bowel regimen in place while on narcotics   -- PT and OT in the AM   -- CBC and BMP in AM     COPD: Mild per recent PFTs per Pulm note 8/24/21.   - Continue PTA Anoro Ellipta and albuterol inhalers     Tobacco use disorder: Ongoing cessation strongly encouraged.     Insomnia  MDD: Continue Wellbutrin 150 mg po qam. Trazodone 150 mg at bedtime held by primary team    Polycythemia vera: Follows with [facility].   - Maintained on Ruxolitinib, monitor CBC daily     Impaired fasting glucose: A1C 8/26/21 5.9.   - No indication for sliding scale insulin during hospital stay     Hypercholesterolemia: Continue PTA Lipitor 80 mg po every day      The patient's care was discussed with the Attending Physician, Dr. Hu, Bedside Nurse and Patient.    Britney Morris PA-C  [facility]  Securely message with the Vocera Web Console (learn more here)  Text page via [employer] Paging/Directory  ______________________________________________________________________    Chief Complaint   RLL adenocarcinoma     History is obtained  from the patient    History of Present Illness   Margret Alves is a 76 year old female with PMHx of tobacco use disorder, COPD, insomnia, MDD, polycythemia vera, impaired fasting glucose, and adenocarcinoma RLL admitted on 8/31/2021 and underwent limited right thoracotomy, RL lobectomy with mediastinal LN dissection. Hospitalist service was consulted for medical co-management.     Resting in bed. States she cannot get comfortable. Complaining of neck pain and discomfort at her chest tube site. Vitals stable. No SOB, chest pain, nausea, vomiting. Pre-op H&P reviewed. No recent medication changes. No recent illness or antibiotic courses. Has stopped smoking.     Review of Systems   The 10 point Review of Systems is negative other than noted in the HPI.    Past Medical History    I have reviewed this patient's medical history and updated it with pertinent information if needed.   Past Medical History:   Diagnosis Date     Adenocarcinoma of lung, stage 1, right (H)      COPD (chronic obstructive pulmonary disease) (H)      Impaired fasting glucose      Insomnia      Moderate episode of recurrent major depressive disorder (H)      Other emphysema (H)      Polycythemia vera (H)      Pulmonary nodule      Pure hypercholesterolemia      Senile osteoporosis      Tobacco use disorder      Past Surgical History   I have reviewed this patient's surgical history and updated it with pertinent information if needed.  Past Surgical History:   Procedure Laterality Date     APPENDECTOMY       ENT SURGERY      tonsillectomy     GYN SURGERY      hysterectomy, oophorectomy     Social History   I have reviewed this patient's social history and updated it with pertinent information if needed.  Social History     Tobacco Use     Smoking status: Current Every Day Smoker     Packs/day: 1.00     Years: 58.00     Pack years: 58.00     Types: Cigarettes     Smokeless tobacco: Never Used   Substance Use Topics     Alcohol use: Yes     Comment:  occasional     Drug use: None     Family History   Sister: Lung cancer, emphysema   Mother: Tuberculosis   Brother: Hypertension  Daughter: Melanoma  Daughter: High cholesterol   Son: Melanoma     Medications   Medications Prior to Admission   Medication Sig Dispense Refill Last Dose     albuterol (PROAIR HFA/PROVENTIL HFA/VENTOLIN HFA) 108 (90 Base) MCG/ACT inhaler Inhale 2 puffs into the lungs every 6 hours   Past Month at PRN     atorvastatin (LIPITOR) 80 MG tablet Take 80 mg by mouth daily   8/30/2021 at PM     buPROPion (WELLBUTRIN XL) 150 MG 24 hr tablet Take 150 mg by mouth every morning   8/30/2021 at AM     nicotine (NICORETTE) 2 MG gum Place 2 mg inside cheek as needed for smoking cessation    at PRN     ruxolitinib 10 MG TABS tablet Take 10 mg by mouth 2 times daily    8/30/2021 at PM     traZODone (DESYREL) 150 MG tablet Take 150 mg by mouth At Bedtime   8/30/2021 at PM     umeclidinium-vilanterol (ANORO ELLIPTA) 62.5-25 MCG/INH oral inhaler Inhale 1 puff into the lungs daily   8/30/2021 at AM       Allergies   Allergies   Allergen Reactions     Amoxicillin Other (See Comments)     thrush     Clindamycin Rash and GI Disturbance       Physical Exam   Vital Signs: Temp: 98  F (36.7  C) Temp src: Temporal BP: 113/62 Pulse: 64   Resp: 12 SpO2: 96 % O2 Device: Nasal cannula Oxygen Delivery: 1 LPM  Weight: 108 lbs 3.2 oz    CONSTITUTIONAL: Pt laying in bed, dressed in hospital garb. Appears comfortable. Cooperative with interview.   HEENT: Normocephalic, atraumatic.   CARDIOVASCULAR: RRR, no murmurs, rubs, or extra heart sounds appreciated. Pulses +2/4 and regular in upper and lower extremities, bilaterally.   RESPIRATORY: No increased work of breathing. CTA, bilat; no wheezes, rales, or rhonchi appreciated.  GASTROINTESTINAL:  Abdomen soft, non-distended. BS auscultated in all four quadrants. Negative for tenderness to palpation.  No masses or organomegaly noted.  MUSCULOSKELETAL: No gross deformities noted.  Normal muscle tone.   HEMATOLOGIC/LYMPHATIC/IMMUNOLOGIC: Negative for lower extremity edema, bilaterally.  NEUROLOGIC: Alert and oriented to person, place, and time.  strength intact. No focal neuro deficits.   SKIN: Chest tube in place on right side.     Data   Results for orders placed or performed during the hospital encounter of 08/31/21 (from the past 24 hour(s))   CBC with platelets   Result Value Ref Range    WBC Count 8.1 4.0 - 11.0 10e3/uL    RBC Count 4.52 3.80 - 5.20 10e6/uL    Hemoglobin 13.9 11.7 - 15.7 g/dL    Hematocrit 42.6 35.0 - 47.0 %    MCV 94 78 - 100 fL    MCH 30.8 26.5 - 33.0 pg    MCHC 32.6 31.5 - 36.5 g/dL    RDW 13.9 10.0 - 15.0 %    Platelet Count 475 (H) 150 - 450 10e3/uL   Basic metabolic panel   Result Value Ref Range    Sodium 137 133 - 144 mmol/L    Potassium 3.6 3.4 - 5.3 mmol/L    Chloride 108 94 - 109 mmol/L    Carbon Dioxide (CO2) 26 20 - 32 mmol/L    Anion Gap 3 3 - 14 mmol/L    Urea Nitrogen 21 7 - 30 mg/dL    Creatinine 0.85 0.52 - 1.04 mg/dL    Calcium 8.6 8.5 - 10.1 mg/dL    Glucose 100 (H) 70 - 99 mg/dL    GFR Estimate 68 >60 mL/min/1.73m2   INR   Result Value Ref Range    INR 1.01 0.85 - 1.15   ABO/Rh type and screen    Narrative    The following orders were created for panel order ABO/Rh type and screen.  Procedure                               Abnormality         Status                     ---------                               -----------         ------                     Adult Type and Screen[[id number]]                            Final result                 Please view results for these tests on the individual orders.   Adult Type and Screen   Result Value Ref Range    ABO/RH(D) A POS     Antibody Screen Negative Negative    SPECIMEN EXPIRATION DATE [id number]    XR Chest Port 1 View    Narrative    XR CHEST PORT 1 VIEW  8/31/2021 9:49 AM       INDICATION: post op, primary adenocarcinoma of right lower lobe  COMPARISON: None       Impression    IMPRESSION:  Tiny right apical pneumothorax with right chest tube in  place. Small amount of subcutaneous gas right chest wall. The lungs  are clear.    COLUMBA VALLADARES MD         SYSTEM ID:  [id number]

## 2021-08-31 NOTE — BRIEF OP NOTE
[facility]    Brief Operative Note    Pre-operative diagnosis: Primary adenocarcinoma of lower lobe of right lung (H) [C34.31]  Post-operative diagnosis right lower lobe lung adenocarcinoma    Procedure: Procedure(s):  RIGHT LIMITED THORACOTOMY  RIGHT LOWER LOBECTOMY, MEDIASTINAL LYMPH NODE DISSECTION  Surgeon: Surgeon(s) and Role:     * Meño Wilcox MD - Primary     * Ysabel Dockery PA-C - Assisting  Anesthesia: General   Estimated blood loss: 5 cc  Drains:  one 28 straight chest tube to right apex  Specimens:   ID Type Source Tests Collected by Time Destination   1 : LYMPH NODE 9 INFERIOR PULMONARY LIGAMENT Tissue Lymph Node(s) SURGICAL PATHOLOGY EXAM Meño Wilcox MD 8/31/2021  8:26 AM    2 : LYMPH NODE 7 SUBCARINAL Tissue Lymph Node(s) SURGICAL PATHOLOGY EXAM Meño Wilcox MD 8/31/2021  8:29 AM    3 : LYMPH NODE 4R  RIGHT LOWER PARATRACHEAL Tissue Lymph Node(s), Paratracheal, Right SURGICAL PATHOLOGY EXAM Meño Wilcox MD 8/31/2021  8:32 AM    4 : LYMPH NODE 11 INTERLOBAR Tissue Lymph Node(s) SURGICAL PATHOLOGY EXAM Meño Wilcox MD 8/31/2021  8:42 AM    5 : LYMPH NODE 11 INTERLOBAR SUMP Tissue Lymph Node(s) SURGICAL PATHOLOGY EXAM Meño Wilcox MD 8/31/2021  8:49 AM    6 : LYMPH NODE 12 RIGHT LOWER LOBE BRONCHUS  Tissue Lymph Node(s) SURGICAL PATHOLOGY EXAM Meño Wilcox MD 8/31/2021  8:55 AM    7 : RIGHT LOWER LOBE LUNG Tissue Lung, Lower Lobe, Right SURGICAL PATHOLOGY EXAM Meño Wilcox MD 8/31/2021  8:56 AM      Findings:   No pleural fluid nor pleural nodules  Complications: None.  Implants:   Implant Name Type Inv. Item Serial No.  Lot No. LRB No. Used Action   SEALANT PLEURAL AIRLEAK PROGEL 4ML [id number] - [id number] Other SEALANT PLEURAL AIRLEAK PROGEL 4ML [id number] [employer] [id number] Right 1 Implanted

## 2021-08-31 NOTE — ANESTHESIA POSTPROCEDURE EVALUATION
Patient: Margret Alves    Procedure(s):  RIGHT LIMITED THORACOTOMY  RIGHT LOWER LOBECTOMY, MEDIASTINAL LYMPH NODE DISSECTION    Diagnosis:Primary adenocarcinoma of lower lobe of right lung (H) [C34.31]  Diagnosis Additional Information: No value filed.    Anesthesia Type:  General    Note:  Disposition: Inpatient   Postop Pain Control: Uneventful            Sign Out: Well controlled pain   PONV: No   Neuro/Psych: Uneventful            Sign Out: Acceptable/Baseline neuro status   Airway/Respiratory: Uneventful            Sign Out: Acceptable/Baseline resp. status   CV/Hemodynamics: Uneventful            Sign Out: Acceptable CV status   Other NRE: NONE   DID A NON-ROUTINE EVENT OCCUR? No           Last vitals:  Vitals Value Taken Time   /86 08/31/21 0950   Temp     Pulse 71 08/31/21 1000   Resp 15 08/31/21 1000   SpO2 100 % 08/31/21 1000   Vitals shown include unvalidated device data.    Electronically Signed By: Jamison Dillard MD  August 31, 2021  10:01 AM

## 2021-09-01 ENCOUNTER — APPOINTMENT (OUTPATIENT)
Dept: GENERAL RADIOLOGY | Facility: CLINIC | Age: 76
DRG: 164 | End: 2021-09-01
Attending: PHYSICIAN ASSISTANT
Payer: COMMERCIAL

## 2021-09-01 LAB
ANION GAP SERPL CALCULATED.3IONS-SCNC: 2 MMOL/L (ref 3–14)
BASOPHILS # BLD AUTO: 0 10E3/UL (ref 0–0.2)
BASOPHILS NFR BLD AUTO: 0 %
BUN SERPL-MCNC: 18 MG/DL (ref 7–30)
CALCIUM SERPL-MCNC: 8 MG/DL (ref 8.5–10.1)
CHLORIDE BLD-SCNC: 106 MMOL/L (ref 94–109)
CO2 SERPL-SCNC: 29 MMOL/L (ref 20–32)
CREAT SERPL-MCNC: 0.79 MG/DL (ref 0.52–1.04)
EOSINOPHIL # BLD AUTO: 0 10E3/UL (ref 0–0.7)
EOSINOPHIL NFR BLD AUTO: 0 %
ERYTHROCYTE [DISTWIDTH] IN BLOOD BY AUTOMATED COUNT: 13.9 % (ref 10–15)
GFR SERPL CREATININE-BSD FRML MDRD: 74 ML/MIN/1.73M2
GLUCOSE BLD-MCNC: 132 MG/DL (ref 70–99)
HCT VFR BLD AUTO: 36.9 % (ref 35–47)
HGB BLD-MCNC: 12.1 G/DL (ref 11.7–15.7)
IMM GRANULOCYTES # BLD: 0.1 10E3/UL
IMM GRANULOCYTES NFR BLD: 1 %
LYMPHOCYTES # BLD AUTO: 1.6 10E3/UL (ref 0.8–5.3)
LYMPHOCYTES NFR BLD AUTO: 11 %
MCH RBC QN AUTO: 31.2 PG (ref 26.5–33)
MCHC RBC AUTO-ENTMCNC: 32.8 G/DL (ref 31.5–36.5)
MCV RBC AUTO: 95 FL (ref 78–100)
MONOCYTES # BLD AUTO: 1.1 10E3/UL (ref 0–1.3)
MONOCYTES NFR BLD AUTO: 8 %
NEUTROPHILS # BLD AUTO: 11.6 10E3/UL (ref 1.6–8.3)
NEUTROPHILS NFR BLD AUTO: 80 %
NRBC # BLD AUTO: 0 10E3/UL
NRBC BLD AUTO-RTO: 0 /100
PLATELET # BLD AUTO: 439 10E3/UL (ref 150–450)
POTASSIUM BLD-SCNC: 4.1 MMOL/L (ref 3.4–5.3)
RBC # BLD AUTO: 3.88 10E6/UL (ref 3.8–5.2)
SODIUM SERPL-SCNC: 137 MMOL/L (ref 133–144)
WBC # BLD AUTO: 14.4 10E3/UL (ref 4–11)

## 2021-09-01 PROCEDURE — 80048 BASIC METABOLIC PNL TOTAL CA: CPT | Performed by: PHYSICIAN ASSISTANT

## 2021-09-01 PROCEDURE — 120N000001 HC R&B MED SURG/OB

## 2021-09-01 PROCEDURE — 250N000009 HC RX 250: Performed by: PHYSICIAN ASSISTANT

## 2021-09-01 PROCEDURE — 36415 COLL VENOUS BLD VENIPUNCTURE: CPT | Performed by: PHYSICIAN ASSISTANT

## 2021-09-01 PROCEDURE — 250N000013 HC RX MED GY IP 250 OP 250 PS 637: Performed by: THORACIC SURGERY (CARDIOTHORACIC VASCULAR SURGERY)

## 2021-09-01 PROCEDURE — 99232 SBSQ HOSP IP/OBS MODERATE 35: CPT | Performed by: INTERNAL MEDICINE

## 2021-09-01 PROCEDURE — 258N000003 HC RX IP 258 OP 636: Performed by: PHYSICIAN ASSISTANT

## 2021-09-01 PROCEDURE — 250N000011 HC RX IP 250 OP 636: Performed by: PHYSICIAN ASSISTANT

## 2021-09-01 PROCEDURE — 85025 COMPLETE CBC W/AUTO DIFF WBC: CPT | Performed by: PHYSICIAN ASSISTANT

## 2021-09-01 PROCEDURE — 71045 X-RAY EXAM CHEST 1 VIEW: CPT

## 2021-09-01 PROCEDURE — 250N000013 HC RX MED GY IP 250 OP 250 PS 637: Performed by: PHYSICIAN ASSISTANT

## 2021-09-01 RX ORDER — ACETAMINOPHEN 500 MG
1000 TABLET ORAL 4 TIMES DAILY
Status: DISCONTINUED | OUTPATIENT
Start: 2021-09-01 | End: 2021-09-16 | Stop reason: HOSPADM

## 2021-09-01 RX ADMIN — ENOXAPARIN SODIUM 40 MG: 40 INJECTION SUBCUTANEOUS at 09:22

## 2021-09-01 RX ADMIN — KETOROLAC TROMETHAMINE 15 MG: 15 INJECTION, SOLUTION INTRAMUSCULAR; INTRAVENOUS at 21:43

## 2021-09-01 RX ADMIN — UMECLIDINIUM BROMIDE AND VILANTEROL TRIFENATATE 1 PUFF: 62.5; 25 POWDER RESPIRATORY (INHALATION) at 11:04

## 2021-09-01 RX ADMIN — HYDROMORPHONE HYDROCHLORIDE 2 MG: 2 TABLET ORAL at 05:50

## 2021-09-01 RX ADMIN — KETOROLAC TROMETHAMINE 15 MG: 15 INJECTION, SOLUTION INTRAMUSCULAR; INTRAVENOUS at 15:51

## 2021-09-01 RX ADMIN — HYDROMORPHONE HYDROCHLORIDE 0.3 MG: 0.2 INJECTION, SOLUTION INTRAMUSCULAR; INTRAVENOUS; SUBCUTANEOUS at 01:16

## 2021-09-01 RX ADMIN — BUPROPION HYDROCHLORIDE 150 MG: 150 TABLET, FILM COATED, EXTENDED RELEASE ORAL at 09:21

## 2021-09-01 RX ADMIN — HYDROMORPHONE HYDROCHLORIDE 1 MG: 2 TABLET ORAL at 16:57

## 2021-09-01 RX ADMIN — FAMOTIDINE 20 MG: 20 TABLET ORAL at 09:21

## 2021-09-01 RX ADMIN — SENNOSIDES AND DOCUSATE SODIUM 1 TABLET: 8.6; 5 TABLET ORAL at 09:21

## 2021-09-01 RX ADMIN — ACETAMINOPHEN 1000 MG: 500 TABLET, FILM COATED ORAL at 18:03

## 2021-09-01 RX ADMIN — KETOROLAC TROMETHAMINE 15 MG: 15 INJECTION, SOLUTION INTRAMUSCULAR; INTRAVENOUS at 04:04

## 2021-09-01 RX ADMIN — ATORVASTATIN CALCIUM 80 MG: 40 TABLET, FILM COATED ORAL at 20:08

## 2021-09-01 RX ADMIN — ACETAMINOPHEN 975 MG: 325 TABLET, FILM COATED ORAL at 12:37

## 2021-09-01 RX ADMIN — HYDROMORPHONE HYDROCHLORIDE 1 MG: 2 TABLET ORAL at 12:37

## 2021-09-01 RX ADMIN — POLYETHYLENE GLYCOL 3350 17 G: 17 POWDER, FOR SOLUTION ORAL at 09:22

## 2021-09-01 RX ADMIN — SENNOSIDES AND DOCUSATE SODIUM 1 TABLET: 8.6; 5 TABLET ORAL at 20:08

## 2021-09-01 RX ADMIN — BACITRACIN: 500 OINTMENT TOPICAL at 11:50

## 2021-09-01 RX ADMIN — KETOROLAC TROMETHAMINE 15 MG: 15 INJECTION, SOLUTION INTRAMUSCULAR; INTRAVENOUS at 09:24

## 2021-09-01 RX ADMIN — FAMOTIDINE 20 MG: 20 TABLET ORAL at 20:09

## 2021-09-01 RX ADMIN — HYDROMORPHONE HYDROCHLORIDE 1 MG: 2 TABLET ORAL at 09:34

## 2021-09-01 RX ADMIN — SODIUM CHLORIDE: 9 INJECTION, SOLUTION INTRAVENOUS at 04:04

## 2021-09-01 RX ADMIN — ACETAMINOPHEN 975 MG: 325 TABLET, FILM COATED ORAL at 04:04

## 2021-09-01 ASSESSMENT — ACTIVITIES OF DAILY LIVING (ADL)
ADLS_ACUITY_SCORE: 18
ADLS_ACUITY_SCORE: 19
ADLS_ACUITY_SCORE: 19
ADLS_ACUITY_SCORE: 18
ADLS_ACUITY_SCORE: 19
ADLS_ACUITY_SCORE: 18

## 2021-09-01 NOTE — PLAN OF CARE
Pt is A&Ox4 but forgetful, VSS on RA. Lung sounds diminished, tubular in R lobes. Chest tube to wall suction, + air leak, + crepitus, MD aware. Tele SR. Up w/1 and gait belt, voiding in BSC. Regular diet. CMS intact, thoracic incision and chest tube dressing with small amount of serosanguinous drainage, dressing marked. Pain managed with scheduled Tylenol and toradol and PRN IV and PO Dilaudid. IV running NS at 75 mL/hr. IMC discontinued at 0600. Will continue to follow plan of care.

## 2021-09-01 NOTE — PROGRESS NOTES
THORACIC SURGERY   POD # 1    discussed findings and procedure  AVSS on RA  Small air leak  No bleeding    CXR looks good    Satisfactory    Final path pending    Ambulate  resp care ++    JAMAAL MEJÍA MD [facility] ONCOLOGY THORACIC SURGERY  CELL:  [phone]  OFFICE: [phone]

## 2021-09-01 NOTE — PROGRESS NOTES
[facility]    Medicine Progress Note - Hospitalist Service       Date of Admission:  8/31/2021    Assessment & Plan         Margret Alves is a 76 year old female with PMHx of tobacco use disorder, COPD, insomnia, MDD, polycythemia vera, impaired fasting glucose, and adenocarcinoma RLL admitted on 8/31/2021 and underwent limited right thoracotomy, RL lobectomy with mediastinal LN dissection. Hospitalist service was consulted for medical co-management.      Adenocarcinoma RLL s/p limited right thoracotomy, RL lobectomy with mediastinal LN dissection  Surgery performed 8/31/21 by Dr. Wilcox.   - Defer routine post-operative cares, IVF, DVT prophylaxis and pain control to primary service   - Encourage pulmonary toilet; incentive spirometer at bedside   - Bowel regimen in place while on narcotics   - PT/OT consulted.  Family does express some concern with cognitive impairment recently      COPD. Not decompensated   Mild per recent PFTs per Pulm note 8/24/21.   - Continue PTA Anoro Ellipta and albuterol inhalers      Tobacco use disorder  - Ongoing cessation strongly encouraged     Insomnia  MDD  - PTA Wellbutrin 150 mg po qam  - Trazodone 150 mg at bedtime held by primary team     Polycythemia vera  Follows with [facility].   - Maintained on Ruxolitinib, monitor CBC daily      Impaired fasting glucose  HgbA1C 8/26/21 5.9.   - No indication for sliding scale insulin during hospital stay      Hypercholesterolemia  - PTA Lipitor 80 mg po every day           Diet: Advance Diet as Tolerated: Regular Diet Adult    DVT Prophylaxis: Defer to primary service  Curiel Catheter: Not present  Central Lines: PRESENT     Code Status: Full Code      Disposition Plan   Expected discharge: 09/04/2021.  Defer to primary service.  Will continue to follow while hospitalized      The patient's care was discussed with the Bedside Nurse and Patient.    Ricco Samuel DO  Hospitalist Service  [facility]  [facility]  Securely message with the [employer] Web Console (learn more here)  Text page via [employer] Paging/Directory      Clinically Significant Risk Factors Present on Admission               ______________________________________________________________________    Interval History   Patient seen and examined.  No acute events over night.  No fevers or chills.  Post-op pain is controlled.  Feeling improved since yesterday.  Had breakfast and tolerated it well.    Data reviewed today: I reviewed all medications, new labs and imaging results over the last 24 hours. I personally reviewed no images or EKG's today.    Physical Exam   Vital Signs: Temp: 98.6  F (37  C) Temp src: Oral BP: 124/88 Pulse: 65   Resp: 16 SpO2: 96 % O2 Device: None (Room air)    Weight: 108 lbs 3.2 oz  General Appearance: Resting comfortably. NAD   Respiratory: Clear to auscultation.  No respiratory distress  Cardiovascular: RRR.  No obvious murmurs   GI: Soft.  Non-distended.  Bowel sounds noted  Skin: No obvious rashes or cyanosis to exposed skin  Other: No edema.  Moving all extremities grossly     Data   Recent Labs   Lab 09/01/21  0542 08/31/21  1522 08/31/21  1214 08/31/21  0626   WBC 14.4* 21.9*  --  8.1   HGB 12.1 12.8 14.0  14.0 13.9   MCV 95 94 94 94    442 485* 475*   INR  --   --   --  1.01     --  139 137   POTASSIUM 4.1  --  4.0 3.6   CHLORIDE 106  --  108 108   CO2 29  --  26 26   BUN 18  --  18 21   CR 0.79  --  0.78  0.72 0.85   ANIONGAP 2*  --  5 3   STAN 8.0*  --  8.3* 8.6   *  --  130* 100*   ALBUMIN  --   --  3.8  --    PROTTOTAL  --   --  6.5*  --    BILITOTAL  --   --  0.3  --    ALKPHOS  --   --  22*  --    ALT  --   --  23  --    AST  --   --  18  --      Recent Results (from the past 24 hour(s))   XR Chest Port 1 View    Narrative    EXAM: XR CHEST PORT 1 VIEW  LOCATION: [facility]  DATE/TIME: 9/1/2021 4:55 AM    INDICATION: s/p right thoracotomy  COMPARISON:  08/31/2021.      Impression    IMPRESSION: Status post right thoracotomy. Right apically oriented chest tube in stable position. No visible pneumothorax. Small amount of right perihilar atelectasis. Calcified left upper lobe granuloma. Heart size is normal. Scattered foci of right   lateral chest wall and supraclavicular fossa gas.

## 2021-09-01 NOTE — PLAN OF CARE
POD 1. A&Ox 4 with forgetfulness.  VSS on RA. CMS intact. Tele NSR. Up A1 and GB, walking into bathroom. Pain managed with scheduled tylenol and Toradol, PRN dilaudid. On-Q pump in place. Lung sounds coarse on rt side. Chest tube to wall suction, + air leak, + crepitus MD aware. Thoracic incision with steri strips in place, open to air,  and chest tube dressing changed, C/D/I.  PIV SL. Tolerating regular diet. Discharge pending.

## 2021-09-02 ENCOUNTER — APPOINTMENT (OUTPATIENT)
Dept: GENERAL RADIOLOGY | Facility: CLINIC | Age: 76
DRG: 164 | End: 2021-09-02
Attending: PHYSICIAN ASSISTANT
Payer: COMMERCIAL

## 2021-09-02 LAB
ALBUMIN UR-MCNC: NEGATIVE MG/DL
APPEARANCE UR: CLEAR
BILIRUB UR QL STRIP: NEGATIVE
COLOR UR AUTO: NORMAL
GLUCOSE BLDC GLUCOMTR-MCNC: 94 MG/DL (ref 70–99)
GLUCOSE UR STRIP-MCNC: NEGATIVE MG/DL
HGB UR QL STRIP: NEGATIVE
KETONES UR STRIP-MCNC: NEGATIVE MG/DL
LEUKOCYTE ESTERASE UR QL STRIP: NEGATIVE
NITRATE UR QL: NEGATIVE
PH UR STRIP: 6 [PH] (ref 5–7)
SP GR UR STRIP: 1.01 (ref 1–1.03)
UROBILINOGEN UR STRIP-MCNC: NORMAL MG/DL

## 2021-09-02 PROCEDURE — 120N000001 HC R&B MED SURG/OB

## 2021-09-02 PROCEDURE — 81003 URINALYSIS AUTO W/O SCOPE: CPT | Performed by: INTERNAL MEDICINE

## 2021-09-02 PROCEDURE — 71045 X-RAY EXAM CHEST 1 VIEW: CPT

## 2021-09-02 PROCEDURE — 250N000013 HC RX MED GY IP 250 OP 250 PS 637: Performed by: PHYSICIAN ASSISTANT

## 2021-09-02 PROCEDURE — 250N000011 HC RX IP 250 OP 636: Performed by: PHYSICIAN ASSISTANT

## 2021-09-02 PROCEDURE — 250N000013 HC RX MED GY IP 250 OP 250 PS 637: Performed by: THORACIC SURGERY (CARDIOTHORACIC VASCULAR SURGERY)

## 2021-09-02 PROCEDURE — 99232 SBSQ HOSP IP/OBS MODERATE 35: CPT | Performed by: INTERNAL MEDICINE

## 2021-09-02 RX ORDER — FAMOTIDINE 20 MG/1
20 TABLET, FILM COATED ORAL DAILY
Status: DISCONTINUED | OUTPATIENT
Start: 2021-09-03 | End: 2021-09-16 | Stop reason: HOSPADM

## 2021-09-02 RX ADMIN — ACETAMINOPHEN 1000 MG: 500 TABLET, FILM COATED ORAL at 08:01

## 2021-09-02 RX ADMIN — UMECLIDINIUM BROMIDE AND VILANTEROL TRIFENATATE 1 PUFF: 62.5; 25 POWDER RESPIRATORY (INHALATION) at 10:51

## 2021-09-02 RX ADMIN — ATORVASTATIN CALCIUM 80 MG: 40 TABLET, FILM COATED ORAL at 22:28

## 2021-09-02 RX ADMIN — KETOROLAC TROMETHAMINE 15 MG: 15 INJECTION, SOLUTION INTRAMUSCULAR; INTRAVENOUS at 22:28

## 2021-09-02 RX ADMIN — ACETAMINOPHEN 1000 MG: 500 TABLET, FILM COATED ORAL at 13:09

## 2021-09-02 RX ADMIN — ACETAMINOPHEN 1000 MG: 500 TABLET, FILM COATED ORAL at 17:48

## 2021-09-02 RX ADMIN — KETOROLAC TROMETHAMINE 15 MG: 15 INJECTION, SOLUTION INTRAMUSCULAR; INTRAVENOUS at 04:07

## 2021-09-02 RX ADMIN — KETOROLAC TROMETHAMINE 15 MG: 15 INJECTION, SOLUTION INTRAMUSCULAR; INTRAVENOUS at 10:45

## 2021-09-02 RX ADMIN — BUPROPION HYDROCHLORIDE 150 MG: 150 TABLET, FILM COATED, EXTENDED RELEASE ORAL at 08:02

## 2021-09-02 RX ADMIN — FAMOTIDINE 20 MG: 20 TABLET ORAL at 08:02

## 2021-09-02 RX ADMIN — HYDROMORPHONE HYDROCHLORIDE 1 MG: 2 TABLET ORAL at 20:14

## 2021-09-02 RX ADMIN — KETOROLAC TROMETHAMINE 15 MG: 15 INJECTION, SOLUTION INTRAMUSCULAR; INTRAVENOUS at 16:18

## 2021-09-02 RX ADMIN — BACITRACIN: 500 OINTMENT TOPICAL at 08:02

## 2021-09-02 RX ADMIN — HYDROMORPHONE HYDROCHLORIDE 1 MG: 2 TABLET ORAL at 10:41

## 2021-09-02 RX ADMIN — ACETAMINOPHEN 1000 MG: 500 TABLET, FILM COATED ORAL at 01:07

## 2021-09-02 RX ADMIN — ENOXAPARIN SODIUM 40 MG: 40 INJECTION SUBCUTANEOUS at 08:02

## 2021-09-02 ASSESSMENT — ACTIVITIES OF DAILY LIVING (ADL)
ADLS_ACUITY_SCORE: 18
ADLS_ACUITY_SCORE: 18
ADLS_ACUITY_SCORE: 21
ADLS_ACUITY_SCORE: 18

## 2021-09-02 NOTE — PLAN OF CARE
A&Ox4, forgetful. VSS on RA. Regular diet. SBA. Ambulating well. Chest tube intact, air leak noted and MD aware, draining serosanguinous output, set to water seal. On-Q in place, some bloody drainage under clear dressing. Pain controlled with scheduled IV toraldol, PO Tylenol and PRN PO Dilaudid. Incision with steristrips CDI.

## 2021-09-02 NOTE — PLAN OF CARE
VSS.  Pt up in the chair for meals this shift and ambulated multiple times to the bathroom.  Pt has also ambulated in the hallways with SBA x 1 with a gait belt.  Chest tube intact, dressing changed and an air leak noted (MD aware), 160cc of sero/sanguinous drainage noted out the chest tube.  Pt c/o chest tube pain, dilaudid PO and Toradol administered per MD orders, relief noted.  Pt is A/O x 4 however very forgetful and asks the questions multiple times.

## 2021-09-02 NOTE — PLAN OF CARE
Alert and oriented x 3; exhibits forgetfulness. CMS intact. VSS. Course crackles heard in lungs. Denies SOB. 96% on room air. Right back incision sites with steri strips. On-q pump in place. CT @ -20 suction. + air leak and crepitus. Up with one assist, and a gait belt and a walker. Pain was managed with scheduled Toradol. Denies nausea.

## 2021-09-02 NOTE — PROGRESS NOTES
THORACIC SURGERY POD # 2    Doing well  AVSS on RA  Small air leak with cough  No bleeding    CXR ok    CT to water seal  Ambulate  resp care ++  Final path pending    JAMAAL MEJÍA MD [facility] ONCOLOGY THORACIC SURGERY  CELL:  [phone]  OFFICE: [phone]

## 2021-09-02 NOTE — PROGRESS NOTES
[facility]    Medicine Progress Note - Hospitalist Service       Date of Admission:  8/31/2021    Assessment & Plan             Margret Alves is a 76 year old female with PMHx of tobacco use disorder, COPD, insomnia, MDD, polycythemia vera, impaired fasting glucose, and adenocarcinoma RLL admitted on 8/31/2021 and underwent limited right thoracotomy, RL lobectomy with mediastinal LN dissection. Hospitalist service was consulted for medical co-management.      Adenocarcinoma RLL s/p limited right thoracotomy, RL lobectomy with mediastinal LN dissection  Surgery performed 8/31/21 by Dr. Wilcox.   - Defer routine post-operative cares, IVF, DVT prophylaxis and pain control to primary service   - Encourage pulmonary toilet; incentive spirometer at bedside   - Bowel regimen in place while on narcotics   - PT/OT consulted.  Family does express some concern with cognitive impairment recently     Increased urinary frequency  Per nursing patient has been urinating every 1-2 hours concerning for UTI.  UA was obtained but not suggestive of UTI   - Monitor       COPD. Not decompensated   Mild per recent PFTs per Pulm note 8/24/21.   - Continue PTA Anoro Ellipta and albuterol inhalers      Tobacco use disorder  - Ongoing cessation strongly encouraged     Insomnia  MDD  - PTA Wellbutrin 150 mg po qam  - Trazodone 150 mg at bedtime held by primary team     Polycythemia vera  Follows with [facility].   - Maintained on Ruxolitinib, monitor CBC daily      Impaired fasting glucose  HgbA1C 8/26/21 5.9.   - No indication for sliding scale insulin during hospital stay      Hypercholesterolemia  - PTA Lipitor 80 mg po every day           Diet: Advance Diet as Tolerated: Regular Diet Adult    DVT Prophylaxis: Defer to primary service  Curiel Catheter: Not present  Central Lines: PRESENT     Code Status: Full Code      Disposition Plan   Expected discharge: 09/04/2021. Defer to primary service.  Will  continue to follow while here     The patient's care was discussed with the Bedside Nurse and Patient.    Ricco Samuel DO  Hospitalist Service  [facility]  Securely message with the [employer] Web Console (learn more here)  Text page via [employer] Paging/Directory      Clinically Significant Risk Factors Present on Admission               ______________________________________________________________________    Interval History   Patient seen and examined.  No acute events over night.  States the pain at her chest tube site is worse today.  No difficulty breathing.  No fevers or chills.  Nursing does note increased urinary frequency today but patient has no other urinary symptoms.       Data reviewed today: I reviewed all medications, new labs and imaging results over the last 24 hours. I personally reviewed CXR as below    Physical Exam   Vital Signs: Temp: 98.9  F (37.2  C) Temp src: Oral BP: 139/74 Pulse: 87   Resp: 16 SpO2: 99 % O2 Device: None (Room air)    Weight: 108 lbs 3.2 oz  General Appearance: Appears uncomfortable and in pain.  Anxious  Respiratory: No obvious wheezing.  No respiratory distress  Cardiovascular: RRR.  No obvious murmurs  GI: Soft.  Non-distended  Skin: No obvious rashes or cyanosis  Other: No edema.  No calf tenderness      Data   Recent Labs   Lab 09/02/21  0609 09/01/21  0542 08/31/21  1522 08/31/21  1214 08/31/21  0626   WBC  --  14.4* 21.9*  --  8.1   HGB  --  12.1 12.8 14.0  14.0 13.9   MCV  --  95 94 94 94   PLT  --  439 442 485* 475*   INR  --   --   --   --  1.01   NA  --  137  --  139 137   POTASSIUM  --  4.1  --  4.0 3.6   CHLORIDE  --  106  --  108 108   CO2  --  29  --  26 26   BUN  --  18  --  18 21   CR  --  0.79  --  0.78  0.72 0.85   ANIONGAP  --  2*  --  5 3   STAN  --  8.0*  --  8.3* 8.6   GLC 94 132*  --  130* 100*   ALBUMIN  --   --   --  3.8  --    PROTTOTAL  --   --   --  6.5*  --    BILITOTAL  --   --   --  0.3  --    ALKPHOS  --   --   --   22*  --    ALT  --   --   --  23  --    AST  --   --   --  18  --      Recent Results (from the past 24 hour(s))   XR Chest Port 1 View    Narrative    EXAM: XR CHEST PORT 1 VIEW  LOCATION: [facility]  DATE/TIME: 9/2/2021 4:55 AM    INDICATION: s/p right thoracotomy  COMPARISON: 09/01/2021.      Impression    IMPRESSION: Calcified left upper lobe granuloma. Left lung otherwise clear. Heart size normal. Right chest tube in place. Very small triangular focus of lucency in the costophrenic angle could reflect a small amount of residual pleural gas. Right lateral   chest wall and supraclavicular gas has increased.

## 2021-09-02 NOTE — PROGRESS NOTES
[id number] - POD1. A&Ox4 but forgetful - bed alarm on. VSS on RA. Up with 1A and gait belt to BSC. Tele NSR. Chest tube to wall suction, dressing CDI, + air leak and crepitus (MD notified previous shift per RN). Thoracic incision well approximated -  steri-strips with unchanged drainage. IV SL.

## 2021-09-03 ENCOUNTER — APPOINTMENT (OUTPATIENT)
Dept: GENERAL RADIOLOGY | Facility: CLINIC | Age: 76
DRG: 164 | End: 2021-09-03
Attending: PHYSICIAN ASSISTANT
Payer: COMMERCIAL

## 2021-09-03 ENCOUNTER — APPOINTMENT (OUTPATIENT)
Dept: OCCUPATIONAL THERAPY | Facility: CLINIC | Age: 76
DRG: 164 | End: 2021-09-03
Attending: INTERNAL MEDICINE
Payer: COMMERCIAL

## 2021-09-03 LAB — PLATELET # BLD AUTO: 488 10E3/UL (ref 150–450)

## 2021-09-03 PROCEDURE — 85049 AUTOMATED PLATELET COUNT: CPT | Performed by: PHYSICIAN ASSISTANT

## 2021-09-03 PROCEDURE — 120N000001 HC R&B MED SURG/OB

## 2021-09-03 PROCEDURE — 250N000013 HC RX MED GY IP 250 OP 250 PS 637: Performed by: PHYSICIAN ASSISTANT

## 2021-09-03 PROCEDURE — 250N000013 HC RX MED GY IP 250 OP 250 PS 637: Performed by: THORACIC SURGERY (CARDIOTHORACIC VASCULAR SURGERY)

## 2021-09-03 PROCEDURE — 36415 COLL VENOUS BLD VENIPUNCTURE: CPT | Performed by: PHYSICIAN ASSISTANT

## 2021-09-03 PROCEDURE — 71045 X-RAY EXAM CHEST 1 VIEW: CPT

## 2021-09-03 PROCEDURE — 250N000011 HC RX IP 250 OP 636: Performed by: PHYSICIAN ASSISTANT

## 2021-09-03 PROCEDURE — 97535 SELF CARE MNGMENT TRAINING: CPT | Mod: GO

## 2021-09-03 PROCEDURE — 97530 THERAPEUTIC ACTIVITIES: CPT | Mod: GO

## 2021-09-03 PROCEDURE — 250N000009 HC RX 250: Performed by: PHYSICIAN ASSISTANT

## 2021-09-03 PROCEDURE — 97165 OT EVAL LOW COMPLEX 30 MIN: CPT | Mod: GO

## 2021-09-03 PROCEDURE — 99232 SBSQ HOSP IP/OBS MODERATE 35: CPT | Performed by: INTERNAL MEDICINE

## 2021-09-03 RX ORDER — ACETAMINOPHEN 500 MG
1000 TABLET ORAL 4 TIMES DAILY
Qty: 100 TABLET | Refills: 0 | Status: SHIPPED | OUTPATIENT
Start: 2021-09-03

## 2021-09-03 RX ORDER — HYDROMORPHONE HYDROCHLORIDE 2 MG/1
1 TABLET ORAL EVERY 6 HOURS PRN
Qty: 10 TABLET | Refills: 0 | Status: SHIPPED | OUTPATIENT
Start: 2021-09-03 | End: 2021-09-16

## 2021-09-03 RX ORDER — AMOXICILLIN 250 MG
1-3 CAPSULE ORAL 2 TIMES DAILY PRN
Qty: 30 TABLET | Refills: 0 | Status: SHIPPED | OUTPATIENT
Start: 2021-09-03 | End: 2021-09-16

## 2021-09-03 RX ORDER — IBUPROFEN 600 MG/1
600 TABLET, FILM COATED ORAL 4 TIMES DAILY
Qty: 100 TABLET | Refills: 0 | Status: SHIPPED | OUTPATIENT
Start: 2021-09-03

## 2021-09-03 RX ORDER — IBUPROFEN 600 MG/1
600 TABLET, FILM COATED ORAL 4 TIMES DAILY
Status: DISCONTINUED | OUTPATIENT
Start: 2021-09-03 | End: 2021-09-16 | Stop reason: HOSPADM

## 2021-09-03 RX ADMIN — HYDROMORPHONE HYDROCHLORIDE 1 MG: 2 TABLET ORAL at 16:40

## 2021-09-03 RX ADMIN — UMECLIDINIUM BROMIDE AND VILANTEROL TRIFENATATE 1 PUFF: 62.5; 25 POWDER RESPIRATORY (INHALATION) at 09:20

## 2021-09-03 RX ADMIN — SENNOSIDES AND DOCUSATE SODIUM 1 TABLET: 8.6; 5 TABLET ORAL at 20:12

## 2021-09-03 RX ADMIN — ACETAMINOPHEN 1000 MG: 500 TABLET, FILM COATED ORAL at 16:40

## 2021-09-03 RX ADMIN — HYDROMORPHONE HYDROCHLORIDE 1 MG: 2 TABLET ORAL at 09:20

## 2021-09-03 RX ADMIN — ACETAMINOPHEN 1000 MG: 500 TABLET, FILM COATED ORAL at 01:42

## 2021-09-03 RX ADMIN — ENOXAPARIN SODIUM 40 MG: 40 INJECTION SUBCUTANEOUS at 09:20

## 2021-09-03 RX ADMIN — IBUPROFEN 600 MG: 600 TABLET ORAL at 21:57

## 2021-09-03 RX ADMIN — BACITRACIN: 500 OINTMENT TOPICAL at 09:20

## 2021-09-03 RX ADMIN — BUPROPION HYDROCHLORIDE 150 MG: 150 TABLET, FILM COATED, EXTENDED RELEASE ORAL at 09:20

## 2021-09-03 RX ADMIN — SENNOSIDES AND DOCUSATE SODIUM 1 TABLET: 8.6; 5 TABLET ORAL at 09:22

## 2021-09-03 RX ADMIN — KETOROLAC TROMETHAMINE 15 MG: 15 INJECTION, SOLUTION INTRAMUSCULAR; INTRAVENOUS at 06:16

## 2021-09-03 RX ADMIN — KETOROLAC TROMETHAMINE 15 MG: 15 INJECTION, SOLUTION INTRAMUSCULAR; INTRAVENOUS at 12:30

## 2021-09-03 RX ADMIN — ACETAMINOPHEN 1000 MG: 500 TABLET, FILM COATED ORAL at 21:57

## 2021-09-03 RX ADMIN — ACETAMINOPHEN 1000 MG: 500 TABLET, FILM COATED ORAL at 09:20

## 2021-09-03 RX ADMIN — FAMOTIDINE 20 MG: 20 TABLET ORAL at 09:20

## 2021-09-03 RX ADMIN — ATORVASTATIN CALCIUM 80 MG: 40 TABLET, FILM COATED ORAL at 20:12

## 2021-09-03 ASSESSMENT — ACTIVITIES OF DAILY LIVING (ADL)
ADLS_ACUITY_SCORE: 18
ADLS_ACUITY_SCORE: 18
PREVIOUS_RESPONSIBILITIES: MEAL PREP;HOUSEKEEPING;SHOPPING;LAUNDRY;MEDICATION MANAGEMENT;FINANCES;DRIVING
ADLS_ACUITY_SCORE: 18

## 2021-09-03 NOTE — PROGRESS NOTES
[facility]    Medicine Progress Note - Hospitalist Service       Date of Admission:  8/31/2021    Assessment & Plan            Margret Alves is a 76 year old female with PMHx of tobacco use disorder, COPD, insomnia, MDD, polycythemia vera, impaired fasting glucose, and adenocarcinoma RLL admitted on 8/31/2021 and underwent limited right thoracotomy, RL lobectomy with mediastinal LN dissection. Hospitalist service was consulted for medical co-management.      Adenocarcinoma RLL s/p limited right thoracotomy, RL lobectomy with mediastinal LN dissection  Surgery performed 8/31/21 by Dr. Wilcox.   - Defer routine post-operative cares, IVF, DVT prophylaxis and pain control to primary service   - Continue to encourage pulmonary toilet; incentive spirometer at bedside   - Bowel regimen in place while on narcotics   - OT consulted.  Family does express some concern with cognitive impairment recently      Increased urinary frequency  Per nursing patient has been urinating every 1-2 hours concerning for UTI.  UA was obtained but not suggestive of UTI   - Monitor       COPD. Not decompensated   Mild per recent PFTs per Pulm note 8/24/21.   - Continue PTA Anoro Ellipta and albuterol inhalers      Tobacco use disorder  - Ongoing cessation strongly encouraged     Insomnia  MDD  - PTA Wellbutrin 150 mg po qam  - Trazodone 150 mg at bedtime held by primary team     Polycythemia vera  Follows with [facility].   - Maintained on Ruxolitinib     Impaired fasting glucose  HgbA1C 8/26/21 5.9.   - No indication for sliding scale insulin during hospital stay      Hypercholesterolemia  - PTA Lipitor 80 mg po every day             Diet: Advance Diet as Tolerated: Regular Diet Adult    DVT Prophylaxis: Defer to primary service  Curiel Catheter: Not present  Central Lines: PRESENT     Code Status: Full Code      Disposition Plan   Expected discharge: 09/04/2021.  Defer to primary service      The patient's  care was discussed with the Patient.    Ricco Samuel DO  Hospitalist Service  [facility]  Securely message with the [employer] Web Console (learn more here)  Text page via [employer] Paging/Directory      Clinically Significant Risk Factors Present on Admission               ______________________________________________________________________    Interval History   Patient seen and examined.  No acute events over night.  Chest pain is well improved but chest tube still in place.  No fevers or chills.  No difficulty breathing.  Tolerating diet.     Data reviewed today: I reviewed all medications, new labs and imaging results over the last 24 hours. I personally reviewed no images or EKG's today.    Physical Exam   Vital Signs: Temp: 98.3  F (36.8  C) Temp src: Oral BP: (!) 146/76 Pulse: 59   Resp: 16 SpO2: 95 % O2 Device: None (Room air)    Weight: 108 lbs 3.2 oz  General Appearance: Resting comfortably in bed.  NAD   Respiratory: No obvious wheezing.  No respiratory distress  Cardiovascular: RRR.  No obvious murmurs  GI: Soft.  Non-distended  Skin: No obvious rashes or cyanosis to exposed skin  Other: Alert.  Moving all extremities grossly.  Chest tube in place      Data   Recent Labs   Lab 09/03/21  0630 09/02/21  0609 09/01/21  0542 08/31/21  1522 08/31/21  1214 08/31/21  0626   WBC  --   --  14.4* 21.9*  --  8.1   HGB  --   --  12.1 12.8 14.0  14.0 13.9   MCV  --   --  95 94 94 94   *  --  439 442 485* 475*   INR  --   --   --   --   --  1.01   NA  --   --  137  --  139 137   POTASSIUM  --   --  4.1  --  4.0 3.6   CHLORIDE  --   --  106  --  108 108   CO2  --   --  29  --  26 26   BUN  --   --  18  --  18 21   CR  --   --  0.79  --  0.78  0.72 0.85   ANIONGAP  --   --  2*  --  5 3   STAN  --   --  8.0*  --  8.3* 8.6   GLC  --  94 132*  --  130* 100*   ALBUMIN  --   --   --   --  3.8  --    PROTTOTAL  --   --   --   --  6.5*  --    BILITOTAL  --   --   --   --  0.3  --    ALKPHOS   --   --   --   --  22*  --    ALT  --   --   --   --  23  --    AST  --   --   --   --  18  --      Recent Results (from the past 24 hour(s))   XR Chest Port 1 View    Narrative    EXAM: XR CHEST PORT 1 VIEW  LOCATION: [facility]  DATE/TIME: 9/3/2021 5:39 AM    INDICATION: S/p right thoracotomy.  COMPARISON: Chest x-ray on 09/02/2021.      Impression    IMPRESSION: Single AP view of the chest was obtained. Cardiomediastinal silhouette is within normal limits. Trace right apical pneumothorax, chest tube is in place. No significant left pneumothorax. No significant pleural effusion. Right chest wall   subcutaneous emphysema, slightly increased as compared to 09/02/2021 exam.

## 2021-09-03 NOTE — DISCHARGE INSTRUCTIONS
"[facility]  Discharge Orders & Follow-up Care  Video-Assisted Thoracoscopy or Thoracotomy    You already have your follow-up appointments scheduled for you:  Please go to [facility] for a chest x-ray at _12:50 pm on Monday, September 27__. [facility] is located in the same building ([facility], [address] [place] [address]) as Dr. Wilcox' office. They are in [address].  Please then go for your post-operative follow-up appointment in Dr. Wilcox' office, on the second floor of the [facility], [address] at _1:20 pm the same day__. You will see Ysabel Dockery PA-C during your appointment.      Please call Bernarda at Dr. Wilcox  office at [phone] with any scheduling questions.       A. Patient Care:  Call Dr Wilcox  office @ [phone] if you experience:  *Severe chills or a fever or 101 F or higher on two occasions  *Increased incisional pain that cannot be relieved with rest or pain medications  *Presence of unusual incisional or chest tube site drainage that is odorous, green or yellow in color, or if your incision is warm, red or swollen  *Coughing up bright red blood or greenish-yellow secretions  *Chest pain that gets worse with deep breathing or a significant increase in shortness of breath  *Inability to urinate or have a bowel movement  *New pain or swelling in your legs    In an emergency, call 911 or have someone drive you to the nearest Emergency Department    Pain Relief:  You have been given a prescription for narcotic pain medicine called oxycodone.  You may also take ibuprofen and acetaminophen.  Recommended dosages are:  600 mg Ibuprofen every 6 hours as needed and 500-1000 mg Acetaminophen (Tylenol) every 6 hours as needed.  Many patients get good pain relief by \"staggering\" between these three medications.     Constipation:  Narcotic pain medication, general anesthesia, and time in the hospital with less activity than " normal can all cause constipation. Please take a stool softener (what you have at home or one that was prescribed during hospital discharge, such as Senokot-S, docusate sodium, Miralax, Milk of Magnesia) while you are taking narcotics to prevent constipation. Stop taking the stool softener once you are done taking narcotics or if you begin having loose stools/diarrhea. Please call our clinic nurse, Eunice, at [phone] if you are not having success (not having BMs) with your current stool softener.     No driving while on narcotics.     Activity:  _XXX__ No heavy lifting greater than 8-10 pounds on the operative side for 3 weeks     Wound Care:  *You should look at your incision each day and keep it clean while it heals  *Do not apply any creams, salves such as Bacitracin, or ointments on the incision while it is healing  * Steri strips (thin white paper strips) will be present on the incision(s) and they will peel off as your incision heals-- otherwise, they will be removed at your post-op appointment.    *Remove the dressings covering your chest tube site 48 hours (Saturday, 9/18) after your discharge from the hospital. You may then shower.  Wash the incision and chest tube site(s) daily with soap and water. No bathing or immersing incision underwater for approximately 2 weeks or until the chest tube sites are completely healed.     Place a dry gauze dressing (and tape) over the chest tube site because it is normal to have some drainage for a few days after the chest tube is removed. Once the drainage stops, you can stop covering the chest tube site with a dressing. Call our office if the drainage is milky or green in color or foul-smelling.    B. Respiratory:  _XXX__ Utilize Incentive spirometer and flutter valve/acapella (if you received one) 10 times in a row every few hours while awake for a few weeks after discharging home from the hospital    C. Activity:  It may take a few weeks to regain your normal  energy level/stamina. It is important during your recovery to get regular physical activity:  *walk each day at a comfortable pace  *climb stairs as tolerated  *take some rest periods each day but try not to take too many long naps, as this can affect your sleep at night    D. Returning to Work:  Time away from work will depend on your situation. In general, you will need between 1-6 weeks to recover from surgery. Specific dates for returning to work can be discussed at your post-op appointments.

## 2021-09-03 NOTE — PROGRESS NOTES
09/03/21 1416   Quick Adds   Type of Visit Initial Occupational Therapy Evaluation   Living Environment   People in home alone   Current Living Arrangements house  (split entry  home )   Home Accessibility stairs to enter home;stairs within home   Number of Stairs, Within Home, Primary greater than 10 stairs   Transportation Anticipated family or friend will provide  (Pt typically drives )   Living Environment Comments Daughter will stay with pt upon discharge. Pt will have assist from friends as needed.    Self-Care   Usual Activity Tolerance good   Current Activity Tolerance moderate   Equipment Currently Used at Home none   Activity/Exercise/Self-Care Comment Pt independent in all ADLs.    Instrumental Activities of Daily Living (IADL)   Previous Responsibilities meal prep;housekeeping;shopping;laundry;medication management;finances;driving   IADL Comments Hired assist for yardwork.    Disability/Function   Walking or Climbing Stairs Difficulty no   Dressing/Bathing Difficulty no   Toileting issues no   Doing Errands Independently Difficulty (such as shopping) no   Fall history within last six months yes   Number of times patient has fallen within last six months 1   General Information   Onset of Illness/Injury or Date of Surgery 08/31/21   Referring Physician Ricco Samuel, DO   Patient/Family Therapy Goal Statement (OT) Home    Additional Occupational Profile Info/Pertinent History of Current Problem Per chart: Margret Alves is a 76 year old female with PMHx of tobacco use disorder, COPD, insomnia, MDD, polycythemia vera, impaired fasting glucose, and adenocarcinoma RLL admitted on 8/31/2021 and underwent limited right thoracotomy, RL lobectomy with mediastinal LN dissection. Hospitalist service was consulted for medical co-management. Adenocarcinoma RLL s/p limited right thoracotomy, RL lobectomy with mediastinal LN dissection. See chart for details.    Cognitive Status Examination    Orientation Status orientation to person, place and time   Transfers   Transfers bed-chair transfer;sit-stand transfer;toilet transfer   Transfer Skill: Bed to Chair/Chair to Bed   Bed-Chair [place] (Transfers) set up;verbal cues  (Mod I)   Sit-Stand Transfer   Sit-Stand [place] (Transfers) set up;verbal cues  (Mod I)   Toilet Transfer   Type (Toilet Transfer) sit-stand;stand-sit   [place] Level (Toilet Transfer) set up;verbal cues  (Mod I)   Activities of Daily Living   BADL Assessment lower body dressing   Lower Body Dressing Assessment   [place] Level (Lower Body Dressing) set up;verbal cues  (Mod I)   Clinical Impression   Criteria for Skilled Therapeutic Interventions Met (OT) yes   OT Diagnosis Decreased activity tolerance for I/ADls    OT Problem List-Impairments impacting ADL problems related to;activity tolerance impaired   ADL comments/analysis Decreased activity tolerance for I/ADls    Assessment of Occupational Performance 1-3 Performance Deficits   Identified Performance Deficits Decreased activity tolerance for I/ADls (dressing, bathing, toileting)   Planned Therapy Interventions (OT) ADL retraining;IADL retraining;cognition;transfer training   Clinical Decision Making Complexity (OT) low complexity   Therapy Frequency (OT) Daily   Predicted Duration of Therapy 4 days   Risk & Benefits of therapy have been explained evaluation/treatment results reviewed;care plan/treatment goals reviewed;risks/benefits reviewed;patient   OT Discharge Planning    OT Discharge Recommendation (DC Rec) Home with assist;home with outpatient occupational therapy   OT Rationale for DC Rec Home with assist for I/ADLs (including medication and finance management etc) and OP OT to address cognition and independence in I/ADls.    Total Evaluation Time (Minutes)   Total Evaluation Time (Minutes) 5

## 2021-09-03 NOTE — PROGRESS NOTES
THORACIC SURGERY POD # 3    Doing well  AVSS on RA  Small air leak with deep cough only    CXR OK    CT to water seal    Final path pending    Discussed    JAMAAL MEJÍA MD [facility] ONCOLOGY THORACIC SURGERY  CELL:  [phone]  OFFICE: [phone]

## 2021-09-03 NOTE — PLAN OF CARE
Alert and oriented x 3; exhibits forgetfulness. CMS intact. VSS. Course crackles heard in lungs. Denies SOB. 96% on room air. Right back incision sites with steri strips. On-q pump in place. CT to water seal. + air leak. Up with one assist, and a gait belt and a walker. Pain was managed with scheduled Toradol and tylenol. Denies nausea.

## 2021-09-04 ENCOUNTER — APPOINTMENT (OUTPATIENT)
Dept: GENERAL RADIOLOGY | Facility: CLINIC | Age: 76
DRG: 164 | End: 2021-09-04
Attending: PHYSICIAN ASSISTANT
Payer: COMMERCIAL

## 2021-09-04 ENCOUNTER — APPOINTMENT (OUTPATIENT)
Dept: OCCUPATIONAL THERAPY | Facility: CLINIC | Age: 76
DRG: 164 | End: 2021-09-04
Attending: THORACIC SURGERY (CARDIOTHORACIC VASCULAR SURGERY)
Payer: COMMERCIAL

## 2021-09-04 LAB
CREAT SERPL-MCNC: 0.82 MG/DL (ref 0.52–1.04)
GFR SERPL CREATININE-BSD FRML MDRD: 71 ML/MIN/1.73M2

## 2021-09-04 PROCEDURE — 71045 X-RAY EXAM CHEST 1 VIEW: CPT

## 2021-09-04 PROCEDURE — 99232 SBSQ HOSP IP/OBS MODERATE 35: CPT | Performed by: HOSPITALIST

## 2021-09-04 PROCEDURE — 120N000001 HC R&B MED SURG/OB

## 2021-09-04 PROCEDURE — 250N000013 HC RX MED GY IP 250 OP 250 PS 637: Performed by: THORACIC SURGERY (CARDIOTHORACIC VASCULAR SURGERY)

## 2021-09-04 PROCEDURE — 250N000013 HC RX MED GY IP 250 OP 250 PS 637: Performed by: PHYSICIAN ASSISTANT

## 2021-09-04 PROCEDURE — 82565 ASSAY OF CREATININE: CPT | Performed by: THORACIC SURGERY (CARDIOTHORACIC VASCULAR SURGERY)

## 2021-09-04 PROCEDURE — 36415 COLL VENOUS BLD VENIPUNCTURE: CPT | Performed by: THORACIC SURGERY (CARDIOTHORACIC VASCULAR SURGERY)

## 2021-09-04 PROCEDURE — 250N000011 HC RX IP 250 OP 636: Performed by: PHYSICIAN ASSISTANT

## 2021-09-04 PROCEDURE — 97530 THERAPEUTIC ACTIVITIES: CPT | Mod: GO

## 2021-09-04 RX ADMIN — ACETAMINOPHEN 1000 MG: 500 TABLET, FILM COATED ORAL at 21:47

## 2021-09-04 RX ADMIN — ATORVASTATIN CALCIUM 80 MG: 40 TABLET, FILM COATED ORAL at 21:00

## 2021-09-04 RX ADMIN — BUPROPION HYDROCHLORIDE 150 MG: 150 TABLET, FILM COATED, EXTENDED RELEASE ORAL at 09:52

## 2021-09-04 RX ADMIN — IBUPROFEN 600 MG: 600 TABLET ORAL at 13:18

## 2021-09-04 RX ADMIN — HYDROMORPHONE HYDROCHLORIDE 1 MG: 2 TABLET ORAL at 14:15

## 2021-09-04 RX ADMIN — HYDROMORPHONE HYDROCHLORIDE 1 MG: 2 TABLET ORAL at 04:36

## 2021-09-04 RX ADMIN — IBUPROFEN 600 MG: 600 TABLET ORAL at 21:00

## 2021-09-04 RX ADMIN — FAMOTIDINE 20 MG: 20 TABLET ORAL at 09:52

## 2021-09-04 RX ADMIN — HYDROMORPHONE HYDROCHLORIDE 1 MG: 2 TABLET ORAL at 10:48

## 2021-09-04 RX ADMIN — IBUPROFEN 600 MG: 600 TABLET ORAL at 09:52

## 2021-09-04 RX ADMIN — SENNOSIDES AND DOCUSATE SODIUM 1 TABLET: 8.6; 5 TABLET ORAL at 09:53

## 2021-09-04 RX ADMIN — SENNOSIDES AND DOCUSATE SODIUM 1 TABLET: 8.6; 5 TABLET ORAL at 21:00

## 2021-09-04 RX ADMIN — ACETAMINOPHEN 1000 MG: 500 TABLET, FILM COATED ORAL at 10:48

## 2021-09-04 RX ADMIN — ACETAMINOPHEN 1000 MG: 500 TABLET, FILM COATED ORAL at 04:34

## 2021-09-04 RX ADMIN — HYDROMORPHONE HYDROCHLORIDE 1 MG: 2 TABLET ORAL at 21:00

## 2021-09-04 RX ADMIN — UMECLIDINIUM BROMIDE AND VILANTEROL TRIFENATATE 1 PUFF: 62.5; 25 POWDER RESPIRATORY (INHALATION) at 09:55

## 2021-09-04 RX ADMIN — ENOXAPARIN SODIUM 40 MG: 40 INJECTION SUBCUTANEOUS at 09:53

## 2021-09-04 RX ADMIN — BACITRACIN: 500 OINTMENT TOPICAL at 09:54

## 2021-09-04 RX ADMIN — MAGNESIUM HYDROXIDE 30 ML: 400 SUSPENSION ORAL at 15:15

## 2021-09-04 RX ADMIN — HYDROMORPHONE HYDROCHLORIDE 1 MG: 2 TABLET ORAL at 07:38

## 2021-09-04 RX ADMIN — ACETAMINOPHEN 1000 MG: 500 TABLET, FILM COATED ORAL at 18:06

## 2021-09-04 ASSESSMENT — ACTIVITIES OF DAILY LIVING (ADL)
ADLS_ACUITY_SCORE: 18

## 2021-09-04 NOTE — PLAN OF CARE
A and O x4 though forgetful at times. CT with minimal serosang output.Occ air leak at times; MD aware. CT site re-dressed due to dressing becoming loose. Scheduled tylenol and 1 mg Dilaudid given x1 for pain with good effect. Voiding well.

## 2021-09-04 NOTE — PLAN OF CARE
A&O x 4. VSS on room air. SBA to manage CT. regular diet, tolerating well. PIV saline locked. R chest incision WDL, steri strips intact. Denies nausea, SOB. Pain managed with prn dilaudid, scheduled tylenol, advil. CT with small serosanguinous output, air leak present, provider aware. Atrium changed, was knocked over accidentally. OnQ pump in place, infusing, sensors taped, clamps open. Voiding spontaneously in bathroom. BS active, +flatus, -BM, prune juice and milk of magnesia given. Continue plan of care.

## 2021-09-04 NOTE — PROGRESS NOTES
THORACIC SURGERY POD #    Doing well  AVS on RA  Small intermittent air leak with cough only    Stable subcutaneous emphysema  CXR stable    Continue CT to water seal for now    Discussed    JAMAAL MEJÍA MD [facility] ONCOLOGY THORACIC SURGERY  CELL:  [phone]  OFFICE: [phone]

## 2021-09-04 NOTE — PLAN OF CARE
A&O x 4. Mild hypertension at times, otherwise VSS on room air. SBA to manage CT. regular diet, tolerating well. PIV saline locked. R chest incision WDL, steri strips intact. Denies nausea, SOB. Pain managed with prn dilaudid, scheduled tylenol, advil. CT with small serosanguinous output, air leak present, provider aware. OnQ pump in place, infusing, sensors taped, clamps open. Voiding spontaneously in bathroom. BS active, +flatus, -BM. Continue plan of care.

## 2021-09-04 NOTE — PROGRESS NOTES
[facility]    Medicine Progress Note - Hospitalist Service       Date of Admission:  8/31/2021    Assessment & Plan         Margret Alves is a 76 year old female with PMHx of tobacco use disorder, COPD, insomnia, MDD, polycythemia vera, impaired fasting glucose, and adenocarcinoma RLL admitted on 8/31/2021 and underwent limited right thoracotomy, RL lobectomy with mediastinal LN dissection. Hospitalist service was consulted for medical co-management.      Adenocarcinoma RLL s/p limited right thoracotomy, RL lobectomy with mediastinal LN dissection  Surgery performed 8/31/21 by Dr. Wilcox.   - Defer routine post-operative cares, IVF, DVT prophylaxis and pain control to primary service   - Continue to encourage pulmonary toilet; incentive spirometer at bedside   - Bowel regimen in place while on narcotics   - 9/4 - POD 4 - CXR with small R apical ptx - CT surgery managing    Concern of cognitive impairment, per family  - OT consulted.  Family does express some concern with cognitive impairment recently       Increased urinary frequency  Per nursing patient has been urinating every 1-2 hours concerning for UTI.  UA was obtained but not suggestive of UTI   - Monitor       COPD. Not decompensated   Mild per recent PFTs per Pulm note 8/24/21.   - Continue PTA Anoro Ellipta and albuterol inhalers      Tobacco use disorder  - Ongoing cessation strongly encouraged     Insomnia  MDD  - PTA Wellbutrin 150 mg po qam  - Trazodone 150 mg at bedtime held by primary team     Polycythemia vera  Follows with [facility].   - Maintained on Ruxolitinib     Impaired fasting glucose  HgbA1C 8/26/21 5.9.   - No indication for sliding scale insulin during hospital stay      Hypercholesterolemia  - PTA Lipitor 80 mg po every day        Diet: Advance Diet as Tolerated: Regular Diet Adult  Diet    DVT Prophylaxis: Defer to primary service  Curiel Catheter: Not present  Central Lines: PRESENT     Code Status:  Full Code      Disposition Plan   Expected discharge: defer to primary service     The patient's care was discussed with the Bedside Nurse and Patient.    Sudarshan Kumari MD  Hospitalist Service  [facility]  Securely message with the Vocera Web Console (learn more here)  Text page via [employer] Paging/Directory      Clinically Significant Risk Factors Present on Admission                ______________________________________________________________________    Interval History   Care assumed today, patient seen and examined.  Ongoing R thoracic pain, no new/worsening. Surgery managing. No fevers or chills, no other new issues.     Data reviewed today: I reviewed all medications, new labs and imaging results over the last 24 hours. I personally reviewed no images or EKG's today.    Physical Exam   Vital Signs: Temp: 97.5  F (36.4  C) Temp src: Oral BP: 123/75 Pulse: 65   Resp: 16 SpO2: 94 % O2 Device: None (Room air)    Weight: 108 lbs 3.2 oz    Gen: NAD, pleasant  HEENT: Normocephalic, EOMI, MMM  Resp: some coarse on R, no other crackles,  no wheezes, no increased work of resp  CV: S1S2 heard, reg rhythm, reg rate, no pedal edema  Abdo: soft, nontender, nondistended, bowel sounds present  Ext: calves nontender, well perfused  Neuro: AAOx3, CN grossly intact, no facial asymmetry      Data   Recent Labs   Lab 09/04/21  0637 09/03/21  0630 09/02/21  0609 09/01/21  0542 08/31/21  1522 08/31/21  1214 08/31/21  0626   WBC  --   --   --  14.4* 21.9*  --  8.1   HGB  --   --   --  12.1 12.8 14.0  14.0 13.9   MCV  --   --   --  95 94 94 94   PLT  --  488*  --  439 442 485* 475*   INR  --   --   --   --   --   --  1.01   NA  --   --   --  137  --  139 137   POTASSIUM  --   --   --  4.1  --  4.0 3.6   CHLORIDE  --   --   --  106  --  108 108   CO2  --   --   --  29  --  26 26   BUN  --   --   --  18  --  18 21   CR 0.82  --   --  0.79  --  0.78  0.72 0.85   ANIONGAP  --   --   --  2*  --  5 3   STAN  --    --   --  8.0*  --  8.3* 8.6   GLC  --   --  94 132*  --  130* 100*   ALBUMIN  --   --   --   --   --  3.8  --    PROTTOTAL  --   --   --   --   --  6.5*  --    BILITOTAL  --   --   --   --   --  0.3  --    ALKPHOS  --   --   --   --   --  22*  --    ALT  --   --   --   --   --  23  --    AST  --   --   --   --   --  18  --      Recent Results (from the past 24 hour(s))   XR Chest Port 1 View    Narrative    EXAM: XR CHEST PORT 1 VIEW  LOCATION: [facility] [place]  DATE/TIME: 9/4/2021 5:37 AM    INDICATION: S/p right thoracotomy.  COMPARISON: Chest x-ray on 09/02/2021 2 09/03/2021.      Impression    IMPRESSION: Single AP view of the chest was obtained. Cardiomediastinal silhouette is within normal limits. Trace right apical pneumothorax, chest tube is in place. No significant left pneumothorax. No significant pleural effusion. Right chest wall   subcutaneous emphysema, not significantly changed as compared to 09/03/2021 exam. Small calcified hilar and pulmonary granulomas.

## 2021-09-05 ENCOUNTER — APPOINTMENT (OUTPATIENT)
Dept: GENERAL RADIOLOGY | Facility: CLINIC | Age: 76
DRG: 164 | End: 2021-09-05
Attending: PHYSICIAN ASSISTANT
Payer: COMMERCIAL

## 2021-09-05 PROCEDURE — 250N000013 HC RX MED GY IP 250 OP 250 PS 637: Performed by: HOSPITALIST

## 2021-09-05 PROCEDURE — 250N000013 HC RX MED GY IP 250 OP 250 PS 637: Performed by: PHYSICIAN ASSISTANT

## 2021-09-05 PROCEDURE — 120N000001 HC R&B MED SURG/OB

## 2021-09-05 PROCEDURE — 250N000013 HC RX MED GY IP 250 OP 250 PS 637: Performed by: THORACIC SURGERY (CARDIOTHORACIC VASCULAR SURGERY)

## 2021-09-05 PROCEDURE — 250N000009 HC RX 250: Performed by: PHYSICIAN ASSISTANT

## 2021-09-05 PROCEDURE — 250N000011 HC RX IP 250 OP 636: Performed by: PHYSICIAN ASSISTANT

## 2021-09-05 PROCEDURE — 71045 X-RAY EXAM CHEST 1 VIEW: CPT

## 2021-09-05 PROCEDURE — 99232 SBSQ HOSP IP/OBS MODERATE 35: CPT | Performed by: HOSPITALIST

## 2021-09-05 RX ORDER — MAGNESIUM CARB/ALUMINUM HYDROX 105-160MG
296 TABLET,CHEWABLE ORAL
Status: COMPLETED | OUTPATIENT
Start: 2021-09-05 | End: 2021-09-05

## 2021-09-05 RX ADMIN — UMECLIDINIUM BROMIDE AND VILANTEROL TRIFENATATE 1 PUFF: 62.5; 25 POWDER RESPIRATORY (INHALATION) at 10:03

## 2021-09-05 RX ADMIN — HYDROMORPHONE HYDROCHLORIDE 1 MG: 2 TABLET ORAL at 10:01

## 2021-09-05 RX ADMIN — MAGNESIUM CITRATE 296 ML: 1.75 LIQUID ORAL at 14:29

## 2021-09-05 RX ADMIN — ACETAMINOPHEN 1000 MG: 500 TABLET, FILM COATED ORAL at 04:41

## 2021-09-05 RX ADMIN — ACETAMINOPHEN 1000 MG: 500 TABLET, FILM COATED ORAL at 21:19

## 2021-09-05 RX ADMIN — BACITRACIN: 500 OINTMENT TOPICAL at 10:03

## 2021-09-05 RX ADMIN — SENNOSIDES AND DOCUSATE SODIUM 1 TABLET: 8.6; 5 TABLET ORAL at 20:20

## 2021-09-05 RX ADMIN — IBUPROFEN 600 MG: 600 TABLET ORAL at 23:21

## 2021-09-05 RX ADMIN — SENNOSIDES AND DOCUSATE SODIUM 1 TABLET: 8.6; 5 TABLET ORAL at 10:01

## 2021-09-05 RX ADMIN — HYDROMORPHONE HYDROCHLORIDE 1 MG: 2 TABLET ORAL at 03:24

## 2021-09-05 RX ADMIN — HYDROMORPHONE HYDROCHLORIDE 1 MG: 2 TABLET ORAL at 00:14

## 2021-09-05 RX ADMIN — ACETAMINOPHEN 1000 MG: 500 TABLET, FILM COATED ORAL at 16:19

## 2021-09-05 RX ADMIN — IBUPROFEN 600 MG: 600 TABLET ORAL at 06:06

## 2021-09-05 RX ADMIN — HYDROMORPHONE HYDROCHLORIDE 1 MG: 2 TABLET ORAL at 06:08

## 2021-09-05 RX ADMIN — ATORVASTATIN CALCIUM 80 MG: 40 TABLET, FILM COATED ORAL at 20:20

## 2021-09-05 RX ADMIN — ENOXAPARIN SODIUM 40 MG: 40 INJECTION SUBCUTANEOUS at 10:01

## 2021-09-05 RX ADMIN — BUPROPION HYDROCHLORIDE 150 MG: 150 TABLET, FILM COATED, EXTENDED RELEASE ORAL at 10:01

## 2021-09-05 RX ADMIN — IBUPROFEN 600 MG: 600 TABLET ORAL at 13:05

## 2021-09-05 RX ADMIN — FAMOTIDINE 20 MG: 20 TABLET ORAL at 10:01

## 2021-09-05 ASSESSMENT — ACTIVITIES OF DAILY LIVING (ADL)
ADLS_ACUITY_SCORE: 18

## 2021-09-05 NOTE — PROGRESS NOTES
[facility]    Medicine Progress Note - Hospitalist Service       Date of Admission:  8/31/2021    Assessment & Plan           Margret Alves is a 76 year old female with PMHx of tobacco use disorder, COPD, insomnia, MDD, polycythemia vera, impaired fasting glucose, and adenocarcinoma RLL admitted on 8/31/2021 and underwent limited right thoracotomy, RL lobectomy with mediastinal LN dissection. Hospitalist service was consulted for medical co-management.      Adenocarcinoma RLL s/p limited right thoracotomy, RL lobectomy with mediastinal LN dissection  Surgery performed 8/31/21 by Dr. Wilcox.   - Defer routine post-operative cares, IVF, DVT prophylaxis and pain control to primary service   - Continue to encourage pulmonary toilet; incentive spirometer at bedside   - Bowel regimen in place while on narcotics   - 9/5 POD 5 residual ptx noted, CT surg manaing    Constipation  Has history of this when in hospital especially.  No abdo pain or distension, also has not eaten usual amount.   - scheduled and as needed bowel regimen in place, suppository if needed    Concern of cognitive impairment, per family  - OT consulted.  Family does express some concern with cognitive impairment recently       Increased urinary frequency  Per nursing patient has been urinating every 1-2 hours concerning for UTI.  UA was obtained but not suggestive of UTI   - Monitor       COPD. Not decompensated   Mild per recent PFTs per Pulm note 8/24/21.   - Continue PTA Anoro Ellipta and albuterol inhalers      Tobacco use disorder  - Ongoing cessation strongly encouraged     Insomnia  MDD  - PTA Wellbutrin 150 mg po qam  - Trazodone 150 mg at bedtime held by primary team     Polycythemia vera  Follows with [facility].   - Maintained on Ruxolitinib     Impaired fasting glucose  HgbA1C 8/26/21 5.9.   - No indication for sliding scale insulin during hospital stay      Hypercholesterolemia  - PTA Lipitor 80 mg po  every day          Diet: Advance Diet as Tolerated: Regular Diet Adult  Diet    DVT Prophylaxis: Defer to primary service  Curiel Catheter: Not present  Central Lines: None  Code Status: Full Code      Disposition Plan   Expected discharge: defer to primary service    The patient's care was discussed with the Bedside Nurse, Patient and Patient's Family.    Sudarshan Kumari MD  Hospitalist Service  [facility]  Securely message with the Vocera Web Console (learn more here)  Text page via [employer] Paging/Directory      Clinically Significant Risk Factors Present on Admission                ______________________________________________________________________    Interval History   Seen and examined with sister Lakshmi at bedside. Still with R thoracic pain at times, chest tube in place. No fevers or new pain. Constipated, no abdo distension or pain however. Adding mag citrate and then she'll try suppos if needed.    Data reviewed today: I reviewed all medications, new labs and imaging results over the last 24 hours. I personally reviewed no images or EKG's today.    Physical Exam   Vital Signs: Temp: 98.1  F (36.7  C) Temp src: Oral BP: (!) 150/85 Pulse: 67   Resp: 16 SpO2: 97 % O2 Device: None (Room air)    Weight: 108 lbs 3.2 oz    Gen: NAD, pleasant  HEENT: Normocephalic, EOMI, MMM  Resp: R coarse, L clear,  no wheezes, no increased work of resp  L thoracotomy incision intact, no drainage or erythema, tube dressing CDI  CV: S1S2 heard, reg rhythm, reg rate, no pedal edema  Abdo: soft, nontender, nondistended, bowel sounds present  Ext: calves nontender, well perfused  Neuro: AAOx3, CN grossly intact, no facial asymmetry      Data   Recent Labs   Lab 09/04/21  0637 09/03/21  0630 09/02/21  0609 09/01/21  0542 08/31/21  1522 08/31/21  1214 08/31/21  0626   WBC  --   --   --  14.4* 21.9*  --  8.1   HGB  --   --   --  12.1 12.8 14.0  14.0 13.9   MCV  --   --   --  95 94 94 94   PLT  --  488*  --   439 442 485* 475*   INR  --   --   --   --   --   --  1.01   NA  --   --   --  137  --  139 137   POTASSIUM  --   --   --  4.1  --  4.0 3.6   CHLORIDE  --   --   --  106  --  108 108   CO2  --   --   --  29  --  26 26   BUN  --   --   --  18  --  18 21   CR 0.82  --   --  0.79  --  0.78  0.72 0.85   ANIONGAP  --   --   --  2*  --  5 3   STAN  --   --   --  8.0*  --  8.3* 8.6   GLC  --   --  94 132*  --  130* 100*   ALBUMIN  --   --   --   --   --  3.8  --    PROTTOTAL  --   --   --   --   --  6.5*  --    BILITOTAL  --   --   --   --   --  0.3  --    ALKPHOS  --   --   --   --   --  22*  --    ALT  --   --   --   --   --  23  --    AST  --   --   --   --   --  18  --      Recent Results (from the past 24 hour(s))   XR Chest Port 1 View    Narrative    EXAM: XR CHEST PORT 1 VIEW  LOCATION: [facility]  DATE/TIME: 9/5/2021 4:30 AM    INDICATION: s/p right thoracotomy  COMPARISON: Yesterday.      Impression    IMPRESSION: Postoperative changes of right thoracotomy. Right apical chest tube unchanged. Small to moderate-sized right pneumothorax with apical and lateral basilar components. Associated atelectasis of the mid and lower right lung. Small benign   calcified granulomata of the left lung. Mild left basilar atelectasis. Normal heart size and pulmonary vascularity. Chest wall emphysema predominantly on the right related to chest tube placement.

## 2021-09-05 NOTE — PROVIDER NOTIFICATION
MD Notification    Notified Person: Dr. Kumari    Notification Date/Time: 9/5/2021 10:41 AM     Notification Interaction: paged    Purpose of Notification: pt with chronic constipation, hasn't had BM since 8/30, wanting to try mag citrate before supp. had milk of mag and prune juice yesterday.    Orders Received:     Comments:

## 2021-09-05 NOTE — PLAN OF CARE
A&O x 4. VSS on room air. SBA to manage CT. regular diet, tolerating well. PIV saline locked. R chest incision WDL, steri strips intact. Denies nausea, SOB. Cough at times. Pain managed with prn dilaudid, scheduled tylenol, advil. CT with small serosanguinous output, air leak present, provider aware. OnQ pump in place, infusing, sensors taped, clamps open. Voiding spontaneously in bathroom. BS active, +flatus, -BM, prune juice and mag citrate given around 1430, awaiting results. Continue plan of care.

## 2021-09-05 NOTE — PLAN OF CARE
Pt having more pain in right back and near CT insertion site. VSS; Medicated with scheduled tylenol and Ibuprofen, 1 mg Dilaudid given a couple of times as well. Warm packs given as well with good effect. Minimal sero-sang output from CT past 12 hours. Air leak continues. Up with SBA to bathroom   Pt is requesting an order for a colonoscopy. Please advise.

## 2021-09-05 NOTE — PROGRESS NOTES
THORACIC  SURGERY POD # 5    AVSS on RA  Very occasional air leak with deep cough only    CXR;  Residual  ptx    Same Rx    Discussed    JAMAAL MEJÍA MD [facility] ONCOLOGY THORACIC SURGERY  CELL:  [phone]  OFFICE: [phone]

## 2021-09-06 ENCOUNTER — APPOINTMENT (OUTPATIENT)
Dept: GENERAL RADIOLOGY | Facility: CLINIC | Age: 76
DRG: 164 | End: 2021-09-06
Attending: PHYSICIAN ASSISTANT
Payer: COMMERCIAL

## 2021-09-06 LAB — PLATELET # BLD AUTO: 590 10E3/UL (ref 150–450)

## 2021-09-06 PROCEDURE — 250N000013 HC RX MED GY IP 250 OP 250 PS 637: Performed by: PHYSICIAN ASSISTANT

## 2021-09-06 PROCEDURE — 36415 COLL VENOUS BLD VENIPUNCTURE: CPT | Performed by: PHYSICIAN ASSISTANT

## 2021-09-06 PROCEDURE — 120N000001 HC R&B MED SURG/OB

## 2021-09-06 PROCEDURE — 99232 SBSQ HOSP IP/OBS MODERATE 35: CPT | Performed by: HOSPITALIST

## 2021-09-06 PROCEDURE — 71045 X-RAY EXAM CHEST 1 VIEW: CPT

## 2021-09-06 PROCEDURE — 85049 AUTOMATED PLATELET COUNT: CPT | Performed by: PHYSICIAN ASSISTANT

## 2021-09-06 PROCEDURE — 250N000011 HC RX IP 250 OP 636: Performed by: PHYSICIAN ASSISTANT

## 2021-09-06 PROCEDURE — 250N000013 HC RX MED GY IP 250 OP 250 PS 637: Performed by: THORACIC SURGERY (CARDIOTHORACIC VASCULAR SURGERY)

## 2021-09-06 RX ADMIN — IBUPROFEN 600 MG: 600 TABLET ORAL at 23:33

## 2021-09-06 RX ADMIN — IBUPROFEN 600 MG: 600 TABLET ORAL at 18:15

## 2021-09-06 RX ADMIN — ATORVASTATIN CALCIUM 80 MG: 40 TABLET, FILM COATED ORAL at 20:28

## 2021-09-06 RX ADMIN — HYDROMORPHONE HYDROCHLORIDE 1 MG: 2 TABLET ORAL at 03:12

## 2021-09-06 RX ADMIN — HYDROMORPHONE HYDROCHLORIDE 1 MG: 2 TABLET ORAL at 22:35

## 2021-09-06 RX ADMIN — ACETAMINOPHEN 1000 MG: 500 TABLET, FILM COATED ORAL at 16:19

## 2021-09-06 RX ADMIN — HYDROMORPHONE HYDROCHLORIDE 1 MG: 2 TABLET ORAL at 15:28

## 2021-09-06 RX ADMIN — ENOXAPARIN SODIUM 40 MG: 40 INJECTION SUBCUTANEOUS at 09:34

## 2021-09-06 RX ADMIN — ACETAMINOPHEN 1000 MG: 500 TABLET, FILM COATED ORAL at 09:32

## 2021-09-06 RX ADMIN — FAMOTIDINE 20 MG: 20 TABLET ORAL at 09:33

## 2021-09-06 RX ADMIN — HYDROMORPHONE HYDROCHLORIDE 1 MG: 2 TABLET ORAL at 09:32

## 2021-09-06 RX ADMIN — DIPHENHYDRAMINE HYDROCHLORIDE 12.5 MG: 25 SOLUTION ORAL at 23:33

## 2021-09-06 RX ADMIN — BACITRACIN: 500 OINTMENT TOPICAL at 09:34

## 2021-09-06 RX ADMIN — ACETAMINOPHEN 1000 MG: 500 TABLET, FILM COATED ORAL at 03:03

## 2021-09-06 RX ADMIN — BUPROPION HYDROCHLORIDE 150 MG: 150 TABLET, FILM COATED, EXTENDED RELEASE ORAL at 09:32

## 2021-09-06 RX ADMIN — SENNOSIDES AND DOCUSATE SODIUM 1 TABLET: 8.6; 5 TABLET ORAL at 09:32

## 2021-09-06 RX ADMIN — IBUPROFEN 600 MG: 600 TABLET ORAL at 12:48

## 2021-09-06 RX ADMIN — HYDROMORPHONE HYDROCHLORIDE 1 MG: 2 TABLET ORAL at 06:22

## 2021-09-06 RX ADMIN — IBUPROFEN 600 MG: 600 TABLET ORAL at 05:00

## 2021-09-06 RX ADMIN — ACETAMINOPHEN 1000 MG: 500 TABLET, FILM COATED ORAL at 21:04

## 2021-09-06 RX ADMIN — UMECLIDINIUM BROMIDE AND VILANTEROL TRIFENATATE 1 PUFF: 62.5; 25 POWDER RESPIRATORY (INHALATION) at 09:36

## 2021-09-06 ASSESSMENT — ACTIVITIES OF DAILY LIVING (ADL)
ADLS_ACUITY_SCORE: 18

## 2021-09-06 NOTE — PROGRESS NOTES
Patient is A/O x 4 with forgetfulness, vss, a-febrile, up to the bathroom with SBA, tylenol and Ibuprofen helping with pain, s/p right limited thoracotomy  Ant rt lower lobectomy pod# 6, one chest tube in place to water seal, small air leak, MD aware, clamping trial tonight, denies shortness of breath, dressing CDI, continue to monitor.

## 2021-09-06 NOTE — PROGRESS NOTES
[facility]    Medicine Progress Note - Hospitalist Service       Date of Admission:  8/31/2021    Assessment & Plan         Margret Alves is a 76 year old female with PMHx of tobacco use disorder, COPD, insomnia, MDD, polycythemia vera, impaired fasting glucose, and adenocarcinoma RLL admitted on 8/31/2021 and underwent limited right thoracotomy, RL lobectomy with mediastinal LN dissection. Hospitalist service was consulted for medical co-management.      Adenocarcinoma RLL s/p limited right thoracotomy, RL lobectomy with mediastinal LN dissection  Surgery performed 8/31/21 by Dr. Wilcox.   - Defer routine post-operative cares, IVF, DVT prophylaxis and pain control to primary service   - Continue to encourage pulmonary toilet; incentive spirometer at bedside   - Bowel regimen in place while on narcotics   - 9/6 POD 6 residual ptx noted previously, CT surg managing - Clamp tube tonight, per Dr Wilcox' note     Constipation - resolved  Has history of this when in hospital especially.  No abdo pain or distension, also has not eaten usual amount.   - scheduled and as needed bowel regimen in place, suppository if needed  - 9/6 reports large loose BM last night and today     Concern of cognitive impairment, per family  - OT consulted.  Family does express some concern with cognitive impairment recently       Increased urinary frequency  Per nursing patient has been urinating every 1-2 hours concerning for UTI.  UA was obtained but not suggestive of UTI   - Monitor       COPD. Not decompensated   Mild per recent PFTs per Pulm note 8/24/21.   - Continue PTA Anoro Ellipta and albuterol inhalers      Tobacco use disorder  - Ongoing cessation strongly encouraged     Insomnia  MDD  - PTA Wellbutrin 150 mg po qam  - Trazodone 150 mg at bedtime held by primary team     Polycythemia vera  Follows with [facility].   - Maintained on Ruxolitinib     Impaired fasting glucose  HgbA1C 8/26/21 5.9.    - No indication for sliding scale insulin during hospital stay      Hypercholesterolemia  - PTA Lipitor 80 mg po every day       Diet: Advance Diet as Tolerated: Regular Diet Adult  Diet    DVT Prophylaxis: Defer to primary service  Curiel Catheter: Not present  Central Lines: None  Code Status: Full Code      Disposition Plan   Expected discharge: defer to primary service    The patient's care was discussed with the Bedside Nurse, Patient and Patient's Family.    Sudarshan Kumari MD  Hospitalist Service  [facility]  Securely message with the Vocera Web Console (learn more here)  Text page via [employer] Paging/Directory      Clinically Significant Risk Factors Present on Admission                ______________________________________________________________________    Interval History   Seen and examined - patient's daughter Tiffany at bedside. +BM since I saw her yesterday.  Denies new pain, fevers, chills, or sob.    Data reviewed today: I reviewed all medications, new labs and imaging results over the last 24 hours. I personally reviewed no images or EKG's today.    Physical Exam   Vital Signs: Temp: 98.2  F (36.8  C) Temp src: Oral BP: (!) 140/85 Pulse: 75   Resp: 16 SpO2: 98 % O2 Device: None (Room air)    Weight: 108 lbs 3.2 oz    Gen: NAD, very pleasant  HEENT: Normocephalic, EOMI, MMM  Resp: R coarse (tube in place), L clear,  no wheezes, no increased work of resp  CV: S1S2 heard, reg rhythm, reg rate, no pedal edema  Abdo: soft, nontender, nondistended, bowel sounds present  Ext: calves nontender, well perfused  Neuro: AAOx3, CN grossly intact, no facial asymmetry      Data   Recent Labs   Lab 09/06/21  0817 09/04/21  0637 09/03/21  0630 09/02/21  0609 09/01/21  0542 08/31/21  1522 08/31/21  1214 08/31/21  0626   WBC  --   --   --   --  14.4* 21.9*  --  8.1   HGB  --   --   --   --  12.1 12.8 14.0  14.0 13.9   MCV  --   --   --   --  95 94 94 94   *  --  488*  --  857 380 387*  475*   INR  --   --   --   --   --   --   --  1.01   NA  --   --   --   --  137  --  139 137   POTASSIUM  --   --   --   --  4.1  --  4.0 3.6   CHLORIDE  --   --   --   --  106  --  108 108   CO2  --   --   --   --  29  --  26 26   BUN  --   --   --   --  18  --  18 21   CR  --  0.82  --   --  0.79  --  0.78  0.72 0.85   ANIONGAP  --   --   --   --  2*  --  5 3   STAN  --   --   --   --  8.0*  --  8.3* 8.6   GLC  --   --   --  94 132*  --  130* 100*   ALBUMIN  --   --   --   --   --   --  3.8  --    PROTTOTAL  --   --   --   --   --   --  6.5*  --    BILITOTAL  --   --   --   --   --   --  0.3  --    ALKPHOS  --   --   --   --   --   --  22*  --    ALT  --   --   --   --   --   --  23  --    AST  --   --   --   --   --   --  18  --      Recent Results (from the past 24 hour(s))   XR Chest Port 1 View    Narrative    EXAM: XR CHEST PORT 1 VIEW  LOCATION: [facility]  DATE/TIME: 9/6/2021 5:13 AM    INDICATION: Status post right thoracotomy.  COMPARISON: Chest x-ray on 09/05/2021.      Impression    IMPRESSION: Single AP view of the chest was obtained. Small right apical pneumothorax, right chest tube is in place. Pneumomediastinum, appears more prominent as compared to 09/05/2021. Right chest wall subcutaneous emphysema, not significantly changed   as compared to 09/05/2021 exam. No significant left pneumothorax. No significant pleural effusion.

## 2021-09-06 NOTE — PROGRESS NOTES
THORACIC SURGERY POD # 6    Stable  On RA  tidaling  Tiny air leak with initial deep cough only    Clamp CT tonight     Discussed    JAMAAL MEJÍA MD [facility] ONCOLOGY THORACIC SURGERY  CELL:  [phone]  OFFICE: [phone]

## 2021-09-06 NOTE — PLAN OF CARE
Independent, alert and oriented with appropriate use of the call light.  Lung sounds clear/diminished, encouraged respiratory exercises.  Tolerating chest tube to water seal with minor leak.  Incision/wound clean/dry/intact.  Bowel sounds present, passing flatus, tolerating diet.  Voiding with adequate urine output.  Appropriate pain management with continuous pump, scheduled tylenol/ibuprofen and prn hydromorphone.  Continue to monitor.

## 2021-09-06 NOTE — PLAN OF CARE
700-1100. A&O x 4, but forgetful. VSS on room air. SBA to manage CT, otherwise independent. regular diet, tolerating well, but limited appetite. PIV saline locked. R chest incision WDL, steri strips intact. Denies nausea, SOB. Cough at times. Pain managed with prn dilaudid, scheduled tylenol, advil. CT with small serosanguinous output, air leak present, provider aware, plan for clamping trial tonight. OnQ pump removed by pt in sleep, site WDL. Voiding spontaneously in bathroom. BS active, +flatus, +BM, very loose per pt, feeling better. Continue plan of care.

## 2021-09-07 ENCOUNTER — APPOINTMENT (OUTPATIENT)
Dept: GENERAL RADIOLOGY | Facility: CLINIC | Age: 76
DRG: 164 | End: 2021-09-07
Attending: PHYSICIAN ASSISTANT
Payer: COMMERCIAL

## 2021-09-07 LAB
CREAT SERPL-MCNC: 0.81 MG/DL (ref 0.52–1.04)
GFR SERPL CREATININE-BSD FRML MDRD: 72 ML/MIN/1.73M2
INTERPRETATION: NORMAL
LAB DIRECTOR COMMENTS: NORMAL
LAB DIRECTOR DISCLAIMER: NORMAL
LAB DIRECTOR INTERPRETATION: NORMAL
LAB DIRECTOR METHODOLOGY: NORMAL
LAB DIRECTOR RESULTS: NORMAL
MDL NUMBER: NORMAL
MDL NUMBER: NORMAL
SIGNIFICANT RESULTS: NORMAL
SPECIMEN DESCRIPTION: NORMAL
SPECIMEN DESCRIPTION: NORMAL
TEST DETAILS, MDL: NORMAL

## 2021-09-07 PROCEDURE — 82565 ASSAY OF CREATININE: CPT | Performed by: THORACIC SURGERY (CARDIOTHORACIC VASCULAR SURGERY)

## 2021-09-07 PROCEDURE — 250N000013 HC RX MED GY IP 250 OP 250 PS 637: Performed by: THORACIC SURGERY (CARDIOTHORACIC VASCULAR SURGERY)

## 2021-09-07 PROCEDURE — 36415 COLL VENOUS BLD VENIPUNCTURE: CPT | Performed by: THORACIC SURGERY (CARDIOTHORACIC VASCULAR SURGERY)

## 2021-09-07 PROCEDURE — 250N000013 HC RX MED GY IP 250 OP 250 PS 637: Performed by: INTERNAL MEDICINE

## 2021-09-07 PROCEDURE — 250N000013 HC RX MED GY IP 250 OP 250 PS 637: Performed by: PHYSICIAN ASSISTANT

## 2021-09-07 PROCEDURE — 120N000001 HC R&B MED SURG/OB

## 2021-09-07 PROCEDURE — 71045 X-RAY EXAM CHEST 1 VIEW: CPT

## 2021-09-07 PROCEDURE — 250N000011 HC RX IP 250 OP 636: Performed by: PHYSICIAN ASSISTANT

## 2021-09-07 RX ORDER — OXYCODONE HYDROCHLORIDE 5 MG/1
5 TABLET ORAL
Status: DISCONTINUED | OUTPATIENT
Start: 2021-09-07 | End: 2021-09-10

## 2021-09-07 RX ADMIN — ATORVASTATIN CALCIUM 80 MG: 40 TABLET, FILM COATED ORAL at 20:39

## 2021-09-07 RX ADMIN — ACETAMINOPHEN 1000 MG: 500 TABLET, FILM COATED ORAL at 22:01

## 2021-09-07 RX ADMIN — OXYCODONE HYDROCHLORIDE 5 MG: 5 TABLET ORAL at 08:08

## 2021-09-07 RX ADMIN — OXYCODONE HYDROCHLORIDE 5 MG: 5 TABLET ORAL at 11:01

## 2021-09-07 RX ADMIN — IBUPROFEN 600 MG: 600 TABLET ORAL at 11:01

## 2021-09-07 RX ADMIN — HYDROMORPHONE HYDROCHLORIDE 1 MG: 2 TABLET ORAL at 03:51

## 2021-09-07 RX ADMIN — OXYCODONE HYDROCHLORIDE 5 MG: 5 TABLET ORAL at 23:41

## 2021-09-07 RX ADMIN — OXYCODONE HYDROCHLORIDE 5 MG: 5 TABLET ORAL at 14:08

## 2021-09-07 RX ADMIN — ENOXAPARIN SODIUM 40 MG: 40 INJECTION SUBCUTANEOUS at 08:09

## 2021-09-07 RX ADMIN — ACETAMINOPHEN 1000 MG: 500 TABLET, FILM COATED ORAL at 10:02

## 2021-09-07 RX ADMIN — HYDROMORPHONE HYDROCHLORIDE 1 MG: 2 TABLET ORAL at 06:05

## 2021-09-07 RX ADMIN — BUPROPION HYDROCHLORIDE 150 MG: 150 TABLET, FILM COATED, EXTENDED RELEASE ORAL at 08:08

## 2021-09-07 RX ADMIN — IBUPROFEN 600 MG: 600 TABLET ORAL at 23:42

## 2021-09-07 RX ADMIN — HYDROMORPHONE HYDROCHLORIDE 1 MG: 2 TABLET ORAL at 01:32

## 2021-09-07 RX ADMIN — IBUPROFEN 600 MG: 600 TABLET ORAL at 06:06

## 2021-09-07 RX ADMIN — UMECLIDINIUM BROMIDE AND VILANTEROL TRIFENATATE 1 PUFF: 62.5; 25 POWDER RESPIRATORY (INHALATION) at 08:11

## 2021-09-07 RX ADMIN — POLYETHYLENE GLYCOL 3350 17 G: 17 POWDER, FOR SOLUTION ORAL at 08:09

## 2021-09-07 RX ADMIN — ACETAMINOPHEN 1000 MG: 500 TABLET, FILM COATED ORAL at 17:11

## 2021-09-07 RX ADMIN — FAMOTIDINE 20 MG: 20 TABLET ORAL at 08:08

## 2021-09-07 RX ADMIN — SENNOSIDES AND DOCUSATE SODIUM 1 TABLET: 8.6; 5 TABLET ORAL at 20:48

## 2021-09-07 RX ADMIN — OXYCODONE HYDROCHLORIDE 5 MG: 5 TABLET ORAL at 20:39

## 2021-09-07 RX ADMIN — IBUPROFEN 600 MG: 600 TABLET ORAL at 17:11

## 2021-09-07 RX ADMIN — OXYCODONE HYDROCHLORIDE 5 MG: 5 TABLET ORAL at 17:12

## 2021-09-07 RX ADMIN — SENNOSIDES AND DOCUSATE SODIUM 1 TABLET: 8.6; 5 TABLET ORAL at 08:09

## 2021-09-07 RX ADMIN — ACETAMINOPHEN 1000 MG: 500 TABLET, FILM COATED ORAL at 03:18

## 2021-09-07 ASSESSMENT — ACTIVITIES OF DAILY LIVING (ADL)
ADLS_ACUITY_SCORE: 18

## 2021-09-07 ASSESSMENT — MIFFLIN-ST. JEOR: SCORE: 897.37

## 2021-09-07 NOTE — PROGRESS NOTES
Hospitalist service cross-cover note:     Paged regarding pain medications, hydromorphone typically not lasting until due for next dose, increased frequency to every 2 hours.     Cruzito Burt MD   Hospitalist  [phone]

## 2021-09-07 NOTE — PROGRESS NOTES
THORACIC SURGERY POD # 7    Somewhat discouraged  Some increase in subcutaneous emphysema during the night with CT clamped    CXR: stable basilar space    Discussed  Awaiting for resolution of air leak    Final path pending    JAMAAL MEJÍA MD [facility] ONCOLOGY THORACIC SURGERY  CELL:  [phone]  OFFICE: [phone]

## 2021-09-07 NOTE — PROGRESS NOTES
Brief hospitalist note  Consultation for medical co-mgmt    Patient apparently wanted to visitors in room earlier today - appears she did not sleep well.  Discussed with RN - no issues for hospitalist service. Will be available if something comes up and will see her tomorrow if she allows.  Constipation previously resolved. Otherwise CT management by surgery (primary service). Please page primary service with pain control issues. Thanks.

## 2021-09-07 NOTE — PROGRESS NOTES
"CLINICAL NUTRITION SERVICES  -  ASSESSMENT NOTE      Recommendations Ordered by Registered Dietitian (RD):   Please weigh patient  Encourage meals TID vs nutritional supplements as able (pt declined visit with Dietitian today)   Malnutrition:   % Weight Loss:  Unable to determine, lack of previous wts or admission wt   % Intake:  </= 50% for >/= 5 days (severe malnutrition)  Subcutaneous Fat Loss:  Unable to observe  Muscle Loss:  Unable to observe   Fluid Retention:  None noted    Malnutrition Diagnosis: Unable to determine due to lack wt trending and nutrition-focused physical exam. Patient is at risk          REASON FOR ASSESSMENT  Margret Alves is a 76 year old female seen by Registered Dietitian for [facility]      NUTRITION HISTORY  Attempted to visit patient this morning although she asks to be left undisturbed; \"I haven't slept in 4 days\"  Unable to obtain diet hx at this time  No known food allergies recorded   PMH includes tobacco use disorder, COPD, insomnia, MDD, polycythemia vera, impaired fasting glucose, and adenocarcinoma RLL       CURRENT NUTRITION ORDERS  Diet Order:     Regular     Current Intake/Tolerance:  Flow sheets indicate variable 50-75% meal consumption over the past 7 days   She has been ordering 2-3 very small meals/day such as the following   Appears that patient has been consuming <50% estimated nutrition needs throughout this hospitalization    9/6:   Breakfast - coffee  Lunch - skipped  Dinner - coffee    9/5:  Breakfast - omelet and coffee  Lunch - coffee   Dinner - tomato soup and cheese burger    9/4:  Breakfast: coffee  Lunch: skipped  Dinner: pineapple and cottage cheese      NUTRITION FOCUSED PHYSICAL ASSESSMENT FOR DIAGNOSING MALNUTRITION)  No:  Patient declined               Observed:    Deferred    Obtained from Chart/Interdisciplinary Team:  None noted     ANTHROPOMETRICS  Height: 4' 11.488\"  Weight:  108 lbs 3.2 oz - from 8/31   Body mass index is 21.5 kg/m .   Weight Status: " Normal BMI  IBW:  44 kg  %IBW: 111%  Weight History: No weights on file to evaluate. No admission wt obtained   Wt Readings from Last 10 Encounters:   08/31/21 49.1 kg (108 lb 3.2 oz)       LABS  Labs reviewed    MEDICATIONS  Medications reviewed      ASSESSED NUTRITION NEEDS PER APPROVED PRACTICE GUIDELINES:    Dosing Weight 49 kg   Estimated Energy Needs: 1439-9737+ kcals (25-30+ Kcal/Kg)  Justification: maintenance  Estimated Protein Needs: 58-73 grams protein (1.2-1.5 g pro/Kg)  Justification: preservation of lean body mass      MALNUTRITION:  % Weight Loss:  Unable to determine, lack of previous wts or admission wt   % Intake:  </= 50% for >/= 5 days (severe malnutrition)  Subcutaneous Fat Loss:  Unable to observe  Muscle Loss:  Unable to observe   Fluid Retention:  None noted    Malnutrition Diagnosis: Unable to determine due to lack wt trending and nutrition-focused physical exam. Patient is at risk      NUTRITION DIAGNOSIS:  Inadequate oral intake related to suspected variable appetite as evidenced by intakes 50-75% of small, nutritionally inadequate meals 2-3x/day       NUTRITION INTERVENTIONS  Recommendations / Nutrition Prescription  Please weigh patient  Encourage meals TID vs nutritional supplements as able (pt declined visit with Dietitian today)      Implementation  Nutrition education: No education needs assessed at this time      Nutrition Goals  Patient will consume >50% nutritionally adequate meals TID vs the equivalent with protein supplements       MONITORING AND EVALUATION:  Progress towards goals will be monitored and evaluated per protocol and Practice Guidelines      Maria Gonzales RD, LD  Clinical Dietitian   Unit pager [phone]

## 2021-09-07 NOTE — PLAN OF CARE
VSS on RA. A/Ox4. Right side incision/chest tube CDI. Chest tube has an air leak, MD aware. LS diminished. Subcutaneous crepitus in bilateral upper back, chest, and neck, MD aware. Pain controlled with oxycodone, scheduled tylenol and ibuprofen, and aqua pad. Up IND. Tolerating regular diet. BS active, passing flatus, no BM this shift. Voiding adequately. Pt discouraged throughout shift, provided reassurance, clustered care, and allowed for rest.

## 2021-09-07 NOTE — PLAN OF CARE
A&Ox4, forgetful. VSS on RA. CT x1 clamped at midnight, back to water seal now, air leak. Subcutaneous emphysema in upper back and chest up to neck causing a lot of pain and discomfort--scheduled tylenol and ibuprofen, prn PO dilaudid, heat pads. Pt says she has not slept at all the last few days. Denies SOB. IS 1000 Voiding. Up independently. Regular diet.

## 2021-09-08 ENCOUNTER — APPOINTMENT (OUTPATIENT)
Dept: GENERAL RADIOLOGY | Facility: CLINIC | Age: 76
DRG: 164 | End: 2021-09-08
Attending: PHYSICIAN ASSISTANT
Payer: COMMERCIAL

## 2021-09-08 PROCEDURE — 250N000009 HC RX 250: Performed by: PHYSICIAN ASSISTANT

## 2021-09-08 PROCEDURE — 250N000013 HC RX MED GY IP 250 OP 250 PS 637: Performed by: PHYSICIAN ASSISTANT

## 2021-09-08 PROCEDURE — 120N000001 HC R&B MED SURG/OB

## 2021-09-08 PROCEDURE — 250N000011 HC RX IP 250 OP 636: Performed by: PHYSICIAN ASSISTANT

## 2021-09-08 PROCEDURE — 81445 SO NEO GSAP 5-50DNA/DNA&RNA: CPT | Performed by: THORACIC SURGERY (CARDIOTHORACIC VASCULAR SURGERY)

## 2021-09-08 PROCEDURE — 71045 X-RAY EXAM CHEST 1 VIEW: CPT

## 2021-09-08 PROCEDURE — 99232 SBSQ HOSP IP/OBS MODERATE 35: CPT | Performed by: HOSPITALIST

## 2021-09-08 PROCEDURE — 250N000013 HC RX MED GY IP 250 OP 250 PS 637: Performed by: THORACIC SURGERY (CARDIOTHORACIC VASCULAR SURGERY)

## 2021-09-08 RX ADMIN — FAMOTIDINE 20 MG: 20 TABLET ORAL at 09:58

## 2021-09-08 RX ADMIN — BUPROPION HYDROCHLORIDE 150 MG: 150 TABLET, FILM COATED, EXTENDED RELEASE ORAL at 09:58

## 2021-09-08 RX ADMIN — ACETAMINOPHEN 1000 MG: 500 TABLET, FILM COATED ORAL at 04:04

## 2021-09-08 RX ADMIN — ACETAMINOPHEN 1000 MG: 500 TABLET, FILM COATED ORAL at 17:21

## 2021-09-08 RX ADMIN — OXYCODONE HYDROCHLORIDE 5 MG: 5 TABLET ORAL at 22:06

## 2021-09-08 RX ADMIN — UMECLIDINIUM BROMIDE AND VILANTEROL TRIFENATATE 1 PUFF: 62.5; 25 POWDER RESPIRATORY (INHALATION) at 10:00

## 2021-09-08 RX ADMIN — POLYETHYLENE GLYCOL 3350 17 G: 17 POWDER, FOR SOLUTION ORAL at 09:59

## 2021-09-08 RX ADMIN — ACETAMINOPHEN 1000 MG: 500 TABLET, FILM COATED ORAL at 09:58

## 2021-09-08 RX ADMIN — SENNOSIDES AND DOCUSATE SODIUM 1 TABLET: 8.6; 5 TABLET ORAL at 09:58

## 2021-09-08 RX ADMIN — ENOXAPARIN SODIUM 40 MG: 40 INJECTION SUBCUTANEOUS at 09:59

## 2021-09-08 RX ADMIN — SENNOSIDES AND DOCUSATE SODIUM 1 TABLET: 8.6; 5 TABLET ORAL at 21:06

## 2021-09-08 RX ADMIN — OXYCODONE HYDROCHLORIDE 5 MG: 5 TABLET ORAL at 02:50

## 2021-09-08 RX ADMIN — OXYCODONE HYDROCHLORIDE 5 MG: 5 TABLET ORAL at 07:41

## 2021-09-08 RX ADMIN — IBUPROFEN 600 MG: 600 TABLET ORAL at 12:08

## 2021-09-08 RX ADMIN — ACETAMINOPHEN 1000 MG: 500 TABLET, FILM COATED ORAL at 22:06

## 2021-09-08 RX ADMIN — ATORVASTATIN CALCIUM 80 MG: 40 TABLET, FILM COATED ORAL at 21:06

## 2021-09-08 RX ADMIN — BACITRACIN: 500 OINTMENT TOPICAL at 09:59

## 2021-09-08 RX ADMIN — IBUPROFEN 600 MG: 600 TABLET ORAL at 18:47

## 2021-09-08 RX ADMIN — IBUPROFEN 600 MG: 600 TABLET ORAL at 05:38

## 2021-09-08 ASSESSMENT — ACTIVITIES OF DAILY LIVING (ADL)
ADLS_ACUITY_SCORE: 18

## 2021-09-08 NOTE — PROGRESS NOTES
[facility]    Medicine Progress Note - Hospitalist Service       Date of Admission:  8/31/2021    Assessment & Plan         Margret Alves is a 76 year old female with PMHx of tobacco use disorder, COPD, insomnia, MDD, polycythemia vera, impaired fasting glucose, and adenocarcinoma RLL admitted on 8/31/2021 and underwent limited right thoracotomy, RL lobectomy with mediastinal LN dissection. Hospitalist service was consulted for medical co-management.      Adenocarcinoma RLL s/p limited right thoracotomy, RL lobectomy with mediastinal LN dissection  Surgery performed 8/31/21 by Dr. Wilcox.   - Defer routine post-operative cares, IVF, DVT prophylaxis and pain control to primary service   - Continue to encourage pulmonary toilet; incentive spirometer at bedside   - Bowel regimen in place while on narcotics   - 9/8 tube remains, air leak and subcutaneous emphysema noted - mgmt per thoracic surgery     Constipation - resolved  Has history of this when in hospital especially.  No abdo pain or distension, also has not eaten usual amount.   - scheduled and as needed bowel regimen in place, suppository if needed  - 9/6 reports large loose BM last night and 9/7     Concern of cognitive impairment, per family  - OT consulted.  Family does express some concern with cognitive impairment recently       Increased urinary frequency  Per nursing patient has been urinating every 1-2 hours concerning for UTI.  UA was obtained but not suggestive of UTI   - Monitor       COPD. Not decompensated   Mild per recent PFTs per Pulm note 8/24/21.   - Continue PTA Anoro Ellipta and albuterol inhalers      Tobacco use disorder  - Ongoing cessation strongly encouraged     Insomnia  MDD  - PTA Wellbutrin 150 mg po qam  - Trazodone 150 mg at bedtime held by primary team     Polycythemia vera  Follows with [facility].   - Maintained on Ruxolitinib     Impaired fasting glucose  HgbA1C 8/26/21 5.9.   - No  indication for sliding scale insulin during hospital stay      Hypercholesterolemia  - PTA Lipitor 80 mg po every day          Diet: Diet  Snacks/Supplements Adult: Other; any preferred suppelment as desired (RD); With Meals  Regular Diet Adult    DVT Prophylaxis: Defer to primary service  Curiel Catheter: Not present  Central Lines: None  Code Status: Full Code      Disposition Plan   Expected discharge: defer to primary service     The patient's care was discussed with the Bedside Nurse and Patient.    Sudarshan Kumari MD  Hospitalist Service  [facility]  Securely message with the Vocera Web Console (learn more here)  Text page via [employer] Paging/Directory      Clinically Significant Risk Factors Present on Admission                ______________________________________________________________________    Interval History   Patient seen and examined this afternoon.  She is happy to report she was informed lymph nodes were clear and no chemo is planned.  She denies any fevers, shortness of breath or new pain.  Denies abdominal distention or discomfort but has not had a bowel movement again in a couple days.  She says this is somewhat normal for her.  Bowel regimen is in place which I reminded her of.  Chest tube remains in place with surgery managing.    Data reviewed today: I reviewed all medications, new labs and imaging results over the last 24 hours. I personally reviewed no images or EKG's today.    Physical Exam   Vital Signs: Temp: 98.4  F (36.9  C) Temp src: Oral BP: 108/60 Pulse: 83   Resp: 16 SpO2: 95 % O2 Device: None (Room air)    Weight: 106 lbs 11.24 oz    Gen: NAD, very pleasant  HEENT: Normocephalic, EOMI, MMM  Resp: L clear, R side with extra sounds expected with tube, no focal crackles,  no wheezes, no increased work of resp  CV: S1S2 heard, reg rhythm, reg rate, no pedal edema  Abdo: soft, nontender, nondistended, bowel sounds present  Ext: calves nontender, well  perfused  Neuro: AAOx3, CN grossly intact, no facial asymmetry      Data   Recent Labs   Lab 09/07/21  0611 09/06/21  0817 09/04/21  0637 09/03/21  0630 09/02/21  0609   PLT  --  590*  --  488*  --    CR 0.81  --  0.82  --   --    GLC  --   --   --   --  94     Recent Results (from the past 24 hour(s))   XR Chest Port 1 View    Narrative    CHEST ONE VIEW  9/8/2021 9:00 AM     HISTORY: Postop.    COMPARISON: September 7, 2021      Impression    IMPRESSION: Right-sided chest tube tip unchanged in the medial right  apex. Extensive subcutaneous emphysema. No definite pneumothorax. No  definite infiltrate.    HERMILO PITTS MD         SYSTEM ID:  [id number]

## 2021-09-08 NOTE — PLAN OF CARE
A&O x4. VSS on RA. Lung sounds dim. Tolerating reg diet. Bowel sounds active + flatus. Voiding adequately. Back incision WDL. Chest tube to water seal, air leak and crepitus present, dressing CDI. Pain controlled with oxycodone, tylenol and ibuprofen. Up independently.

## 2021-09-08 NOTE — PROGRESS NOTES
"THORACIC SURGERY POD # 8    S: Patient seen ambulating the hallway. Back in the room, she reports pain in control. Breathing stable. Crepitus stable and improving. Discussed swelling within face and voice changes will resolve with time. Final pathology report discussed per Dr. Wilcox. Patient in better spirits after reviewing path report. Do not anticipate further treatment will be needed.     O:  /89 (BP Location: Right arm)   Pulse 75   Temp 98.4  F (36.9  C) (Oral)   Resp 16   Ht 1.511 m (4' 11.49\")   Wt 48.4 kg (106 lb 11.2 oz)   SpO2 98%   BMI 21.20 kg/m    AVSS on room air   Incision: Steri strips in place. No signs of infection  CT: to H2O seal, minimal serosan output, + tidaling, only 1-2 bubbles with cough     Final pathology:   3.5x3.1x2.7cm adenocarcinoma right lower lobe lung, no lymph node involvement   Pathological stage pT2a N0 M0, Final Stage IB    A/P: POD 8 s/p right thoracotomy, right lower lobectomy, mediastinal lymph node dissection   --Airleak and crepitus continues to improve. + tidaling but minimal airleak noted on exam today  --Continue chest tube to H2O seal. AM CXR ordered   --Final pathology as above. Reviewed with the patient today.   --Pending chest tube clamp trial and chest tube removal, possible discharge Friday.     Adele Zepeda PA-C with Dr. Meño Wilcox  [facility]  Cell [phone]      "

## 2021-09-08 NOTE — PROGRESS NOTES
"SPIRITUAL HEALTH SERVICES Progress Note  [facility]    Visited pt due to length of stay. I introduced myself and pt declined visit, stating \"I don't want to see anyone\".    SHS remains available.       Lucy Norton  Chaplain Resident      "

## 2021-09-08 NOTE — PLAN OF CARE
VSS on RA. A/Ox4. Right back incision WDL, CDI, open to air. Chest tube CDI, to water seal, air leak present, MD aware. Crepitus noted on back, chest, and neck, MD aware. Pain controlled with PRN oxycodone and scheduled tylenol and ibuprofen. Up IND. Tolerating regular diet. BS normoactive, passing flatus, no BM this shift. Voiding adequately. LS diminished. Slept for the majority of shift.

## 2021-09-09 ENCOUNTER — APPOINTMENT (OUTPATIENT)
Dept: GENERAL RADIOLOGY | Facility: CLINIC | Age: 76
DRG: 164 | End: 2021-09-09
Attending: PHYSICIAN ASSISTANT
Payer: COMMERCIAL

## 2021-09-09 LAB — PLATELET # BLD AUTO: 726 10E3/UL (ref 150–450)

## 2021-09-09 PROCEDURE — 250N000013 HC RX MED GY IP 250 OP 250 PS 637: Performed by: PHYSICIAN ASSISTANT

## 2021-09-09 PROCEDURE — 85049 AUTOMATED PLATELET COUNT: CPT | Performed by: PHYSICIAN ASSISTANT

## 2021-09-09 PROCEDURE — 250N000011 HC RX IP 250 OP 636: Performed by: PHYSICIAN ASSISTANT

## 2021-09-09 PROCEDURE — 71045 X-RAY EXAM CHEST 1 VIEW: CPT

## 2021-09-09 PROCEDURE — 36415 COLL VENOUS BLD VENIPUNCTURE: CPT | Performed by: PHYSICIAN ASSISTANT

## 2021-09-09 PROCEDURE — 99232 SBSQ HOSP IP/OBS MODERATE 35: CPT | Performed by: HOSPITALIST

## 2021-09-09 PROCEDURE — 250N000013 HC RX MED GY IP 250 OP 250 PS 637: Performed by: THORACIC SURGERY (CARDIOTHORACIC VASCULAR SURGERY)

## 2021-09-09 PROCEDURE — 120N000001 HC R&B MED SURG/OB

## 2021-09-09 RX ORDER — LANOLIN ALCOHOL/MO/W.PET/CERES
3 CREAM (GRAM) TOPICAL
Status: DISCONTINUED | OUTPATIENT
Start: 2021-09-09 | End: 2021-09-16 | Stop reason: HOSPADM

## 2021-09-09 RX ADMIN — ATORVASTATIN CALCIUM 80 MG: 40 TABLET, FILM COATED ORAL at 20:09

## 2021-09-09 RX ADMIN — SENNOSIDES AND DOCUSATE SODIUM 1 TABLET: 8.6; 5 TABLET ORAL at 20:09

## 2021-09-09 RX ADMIN — SENNOSIDES AND DOCUSATE SODIUM 1 TABLET: 8.6; 5 TABLET ORAL at 09:15

## 2021-09-09 RX ADMIN — IBUPROFEN 600 MG: 600 TABLET ORAL at 12:37

## 2021-09-09 RX ADMIN — OXYCODONE HYDROCHLORIDE 5 MG: 5 TABLET ORAL at 22:33

## 2021-09-09 RX ADMIN — FAMOTIDINE 20 MG: 20 TABLET ORAL at 09:15

## 2021-09-09 RX ADMIN — OXYCODONE HYDROCHLORIDE 5 MG: 5 TABLET ORAL at 09:15

## 2021-09-09 RX ADMIN — OXYCODONE HYDROCHLORIDE 5 MG: 5 TABLET ORAL at 12:37

## 2021-09-09 RX ADMIN — UMECLIDINIUM BROMIDE AND VILANTEROL TRIFENATATE 1 PUFF: 62.5; 25 POWDER RESPIRATORY (INHALATION) at 09:16

## 2021-09-09 RX ADMIN — ACETAMINOPHEN 1000 MG: 500 TABLET, FILM COATED ORAL at 10:25

## 2021-09-09 RX ADMIN — OXYCODONE HYDROCHLORIDE 5 MG: 5 TABLET ORAL at 06:17

## 2021-09-09 RX ADMIN — ACETAMINOPHEN 1000 MG: 500 TABLET, FILM COATED ORAL at 22:33

## 2021-09-09 RX ADMIN — BUPROPION HYDROCHLORIDE 150 MG: 150 TABLET, FILM COATED, EXTENDED RELEASE ORAL at 09:15

## 2021-09-09 RX ADMIN — IBUPROFEN 600 MG: 600 TABLET ORAL at 18:49

## 2021-09-09 RX ADMIN — POLYETHYLENE GLYCOL 3350 17 G: 17 POWDER, FOR SOLUTION ORAL at 09:15

## 2021-09-09 RX ADMIN — IBUPROFEN 600 MG: 600 TABLET ORAL at 05:04

## 2021-09-09 RX ADMIN — ACETAMINOPHEN 1000 MG: 500 TABLET, FILM COATED ORAL at 17:06

## 2021-09-09 RX ADMIN — OXYCODONE HYDROCHLORIDE 5 MG: 5 TABLET ORAL at 03:12

## 2021-09-09 RX ADMIN — OXYCODONE HYDROCHLORIDE 5 MG: 5 TABLET ORAL at 17:07

## 2021-09-09 RX ADMIN — BACITRACIN: 500 OINTMENT TOPICAL at 09:19

## 2021-09-09 RX ADMIN — ENOXAPARIN SODIUM 40 MG: 40 INJECTION SUBCUTANEOUS at 09:15

## 2021-09-09 RX ADMIN — ACETAMINOPHEN 1000 MG: 500 TABLET, FILM COATED ORAL at 03:12

## 2021-09-09 ASSESSMENT — ACTIVITIES OF DAILY LIVING (ADL)
ADLS_ACUITY_SCORE: 18
ADLS_ACUITY_SCORE: 16
ADLS_ACUITY_SCORE: 18
ADLS_ACUITY_SCORE: 18

## 2021-09-09 NOTE — PROGRESS NOTES
THORACIC SURGERY POD # 9    AVSS on RA  tidaling and occasional air leak with deep cough only    CXR stable    Same Rx    Discussed    Pathology report reviewed    JAMAAL MEJÍA MD [facility] ONCOLOGY THORACIC SURGERY  CELL:  [phone]  OFFICE: [phone]

## 2021-09-09 NOTE — PLAN OF CARE
A&O x4. VSS on RA. Lung sounds dim. Tolerating reg diet. Bowel sounds active + flatus, -BM. Voiding adequately. Back incision CDI. CT in place to water seal w/ crepitus and air leak. Pain controlled with oxycodone, tylenol, and ibuprofen. Up independently.

## 2021-09-09 NOTE — PLAN OF CARE
A&O x4. VSS on RA. LS diminished. Tolerating reg diet. BS active, no flatus. Voiding. Incisions CDI. CT in place to water seal w/ crepitus and air leak. Pain controlled with oxycodone, tylenol, and ibuprofen. IND.

## 2021-09-10 ENCOUNTER — APPOINTMENT (OUTPATIENT)
Dept: GENERAL RADIOLOGY | Facility: CLINIC | Age: 76
DRG: 164 | End: 2021-09-10
Attending: PHYSICIAN ASSISTANT
Payer: COMMERCIAL

## 2021-09-10 LAB
CREAT SERPL-MCNC: 0.98 MG/DL (ref 0.52–1.04)
GFR SERPL CREATININE-BSD FRML MDRD: 57 ML/MIN/1.73M2
PATH REPORT.ADDENDUM SPEC: NORMAL
PATH REPORT.COMMENTS IMP SPEC: NORMAL
PATH REPORT.FINAL DX SPEC: NORMAL
PATH REPORT.GROSS SPEC: NORMAL
PATH REPORT.MICROSCOPIC SPEC OTHER STN: NORMAL
PATH REPORT.MICROSCOPIC SPEC OTHER STN: NORMAL
PATH REPORT.RELEVANT HX SPEC: NORMAL
PATHOLOGY SYNOPTIC REPORT: NORMAL
PHOTO IMAGE: NORMAL

## 2021-09-10 PROCEDURE — 250N000011 HC RX IP 250 OP 636: Performed by: PHYSICIAN ASSISTANT

## 2021-09-10 PROCEDURE — 120N000001 HC R&B MED SURG/OB

## 2021-09-10 PROCEDURE — 99232 SBSQ HOSP IP/OBS MODERATE 35: CPT | Performed by: HOSPITALIST

## 2021-09-10 PROCEDURE — 71045 X-RAY EXAM CHEST 1 VIEW: CPT

## 2021-09-10 PROCEDURE — 88309 TISSUE EXAM BY PATHOLOGIST: CPT | Mod: 26 | Performed by: PATHOLOGY

## 2021-09-10 PROCEDURE — 250N000013 HC RX MED GY IP 250 OP 250 PS 637: Performed by: PHYSICIAN ASSISTANT

## 2021-09-10 PROCEDURE — 250N000013 HC RX MED GY IP 250 OP 250 PS 637: Performed by: THORACIC SURGERY (CARDIOTHORACIC VASCULAR SURGERY)

## 2021-09-10 PROCEDURE — 88342 IMHCHEM/IMCYTCHM 1ST ANTB: CPT | Mod: 26 | Performed by: PATHOLOGY

## 2021-09-10 PROCEDURE — 88307 TISSUE EXAM BY PATHOLOGIST: CPT | Mod: 26 | Performed by: PATHOLOGY

## 2021-09-10 PROCEDURE — 88305 TISSUE EXAM BY PATHOLOGIST: CPT | Mod: 26 | Performed by: PATHOLOGY

## 2021-09-10 PROCEDURE — 88341 IMHCHEM/IMCYTCHM EA ADD ANTB: CPT | Mod: 26 | Performed by: PATHOLOGY

## 2021-09-10 PROCEDURE — 88360 TUMOR IMMUNOHISTOCHEM/MANUAL: CPT | Mod: 26 | Performed by: PATHOLOGY

## 2021-09-10 PROCEDURE — 82565 ASSAY OF CREATININE: CPT | Performed by: THORACIC SURGERY (CARDIOTHORACIC VASCULAR SURGERY)

## 2021-09-10 PROCEDURE — 36415 COLL VENOUS BLD VENIPUNCTURE: CPT | Performed by: THORACIC SURGERY (CARDIOTHORACIC VASCULAR SURGERY)

## 2021-09-10 RX ORDER — OXYCODONE HYDROCHLORIDE 5 MG/1
5-10 TABLET ORAL EVERY 4 HOURS PRN
Status: DISCONTINUED | OUTPATIENT
Start: 2021-09-10 | End: 2021-09-16 | Stop reason: HOSPADM

## 2021-09-10 RX ORDER — TRAZODONE HYDROCHLORIDE 100 MG/1
100 TABLET ORAL AT BEDTIME
Status: DISCONTINUED | OUTPATIENT
Start: 2021-09-10 | End: 2021-09-11

## 2021-09-10 RX ORDER — LIDOCAINE 4 G/G
1 PATCH TOPICAL
Status: DISCONTINUED | OUTPATIENT
Start: 2021-09-10 | End: 2021-09-16 | Stop reason: HOSPADM

## 2021-09-10 RX ADMIN — IBUPROFEN 600 MG: 600 TABLET ORAL at 12:50

## 2021-09-10 RX ADMIN — LIDOCAINE 1 PATCH: 560 PATCH PERCUTANEOUS; TOPICAL; TRANSDERMAL at 09:51

## 2021-09-10 RX ADMIN — ACETAMINOPHEN 1000 MG: 500 TABLET, FILM COATED ORAL at 04:32

## 2021-09-10 RX ADMIN — FAMOTIDINE 20 MG: 20 TABLET ORAL at 09:51

## 2021-09-10 RX ADMIN — ATORVASTATIN CALCIUM 80 MG: 40 TABLET, FILM COATED ORAL at 19:02

## 2021-09-10 RX ADMIN — OXYCODONE HYDROCHLORIDE 5 MG: 5 TABLET ORAL at 01:44

## 2021-09-10 RX ADMIN — OXYCODONE HYDROCHLORIDE 10 MG: 5 TABLET ORAL at 19:02

## 2021-09-10 RX ADMIN — SENNOSIDES AND DOCUSATE SODIUM 1 TABLET: 8.6; 5 TABLET ORAL at 09:51

## 2021-09-10 RX ADMIN — IBUPROFEN 600 MG: 600 TABLET ORAL at 01:44

## 2021-09-10 RX ADMIN — IBUPROFEN 600 MG: 600 TABLET ORAL at 05:53

## 2021-09-10 RX ADMIN — POLYETHYLENE GLYCOL 3350 17 G: 17 POWDER, FOR SOLUTION ORAL at 09:51

## 2021-09-10 RX ADMIN — OXYCODONE HYDROCHLORIDE 5 MG: 5 TABLET ORAL at 05:53

## 2021-09-10 RX ADMIN — IBUPROFEN 600 MG: 600 TABLET ORAL at 17:42

## 2021-09-10 RX ADMIN — ACETAMINOPHEN 1000 MG: 500 TABLET, FILM COATED ORAL at 21:01

## 2021-09-10 RX ADMIN — ENOXAPARIN SODIUM 40 MG: 40 INJECTION SUBCUTANEOUS at 09:53

## 2021-09-10 RX ADMIN — ACETAMINOPHEN 1000 MG: 500 TABLET, FILM COATED ORAL at 12:50

## 2021-09-10 RX ADMIN — UMECLIDINIUM BROMIDE AND VILANTEROL TRIFENATATE 1 PUFF: 62.5; 25 POWDER RESPIRATORY (INHALATION) at 09:53

## 2021-09-10 RX ADMIN — OXYCODONE HYDROCHLORIDE 5 MG: 5 TABLET ORAL at 15:08

## 2021-09-10 RX ADMIN — OXYCODONE HYDROCHLORIDE 5 MG: 5 TABLET ORAL at 09:51

## 2021-09-10 RX ADMIN — BACITRACIN: 500 OINTMENT TOPICAL at 09:53

## 2021-09-10 RX ADMIN — BUPROPION HYDROCHLORIDE 150 MG: 150 TABLET, FILM COATED, EXTENDED RELEASE ORAL at 09:52

## 2021-09-10 RX ADMIN — TRAZODONE HYDROCHLORIDE 150 MG: 100 TABLET ORAL at 21:01

## 2021-09-10 ASSESSMENT — ACTIVITIES OF DAILY LIVING (ADL)
ADLS_ACUITY_SCORE: 16

## 2021-09-10 NOTE — PLAN OF CARE
[id number]. Alert and oriented x4. VSS. Diminished lung sounds. CT to water seal. Air leak and crepitus present. Dressing CDI. tolerating regular diet. Active bowel sounds. Pain was managed with PRN oxycodone and tylenol. Up independently.

## 2021-09-10 NOTE — PROGRESS NOTES
"Thoracic Surgery POD #10:    /62 (BP Location: Right arm)   Pulse 64   Temp 98.2  F (36.8  C) (Oral)   Resp 16   Ht 1.511 m (4' 11.49\")   Wt 48.4 kg (106 lb 11.2 oz)   SpO2 97%   BMI 21.20 kg/m      CXR: no PTX, same subcutaneous emphysema, CT in good position    S: Frustrated that she \"only got 15 minutes of sleep \" in the past day. Pain control suboptimal-- discussed adding lidocaine patches. Explained reason for chest tube and how it functions.  O: CT: air leak present randomly with phonation and cough, minimal output  Inc: dry  P: Discussed possibility of home with Pneumostat in the next day or two as a possibility-- will d/w Dr. Wilcox  Add lidocaine patches    Ysabel Dockery PA-C with Dr. Meño Wilcox  [facility]  Cell [phone]    Seen again late afternoon with Dr. Wilcox. After discussion, he feels her air leak is improving and safest plan is to stay in-house and await resolution. Patient ook with plan. I have restarted her Trazadone and increased her oxycodone dosage to better manage her sleep/wake cycle and pain. D/W Hospitalist.    Ysabel Dockery PA-C with Dr. Meño Wilcox  [facility]  Cell [phone]      "

## 2021-09-10 NOTE — PROGRESS NOTES
[facility]    Medicine Progress Note - Hospitalist Service       Date of Admission:  8/31/2021    Assessment & Plan         Margret Alves is a 76 year old female with PMHx of tobacco use disorder, COPD, insomnia, MDD, polycythemia vera, impaired fasting glucose, and adenocarcinoma RLL admitted on 8/31/2021 and underwent limited right thoracotomy, RL lobectomy with mediastinal LN dissection. Hospitalist service was consulted for medical co-management.      Adenocarcinoma RLL s/p limited right thoracotomy, RL lobectomy with mediastinal LN dissection  Surgery performed 8/31/21 by Dr. Wilcox.   - Defer routine post-operative cares, IVF, DVT prophylaxis and pain control to primary service   - Continue to encourage pulmonary toilet; incentive spirometer at bedside   - Bowel regimen in place while on narcotics   - 9/9 tube remains, air leak and subcutaneous emphysema noted - mgmt per thoracic surgery     Constipation - resolved  Has history of this when in hospital especially.  No abdo pain or distension, also has not eaten usual amount.   - scheduled and as needed bowel regimen in place, suppository if needed  - 9/6 reports large loose BM last night and 9/7  - 9/9 no bm today or 9/8, but no distension or discomfort     Concern of cognitive impairment, per family  - OT consulted.  Family does express some concern with cognitive impairment recently       Increased urinary frequency  Per nursing patient has been urinating every 1-2 hours concerning for UTI.  UA was obtained but not suggestive of UTI   - Monitor       COPD. Not decompensated   Mild per recent PFTs per Pulm note 8/24/21.   - Continue PTA Anoro Ellipta and albuterol inhalers      Tobacco use disorder  - Ongoing cessation strongly encouraged     Insomnia  MDD  - PTA Wellbutrin 150 mg po qam  - Trazodone 150 mg at bedtime held by primary team - consider resuming     Polycythemia vera  Follows with [facility].   - Maintained on  Ruxolitinib     Impaired fasting glucose  HgbA1C 8/26/21 5.9.   - No indication for sliding scale insulin      Hypercholesterolemia  - PTA Lipitor 80 mg po every day       Diet: Diet  Snacks/Supplements Adult: Other; any preferred suppelment as desired (RD); With Meals  Regular Diet Adult    DVT Prophylaxis: Defer to primary service  Curiel Catheter: Not present  Central Lines: None  Code Status: Full Code      Disposition Plan   Expected discharge: defer to primary team - chest tube still in     The patient's care was discussed with the Bedside Nurse and Patient.    Sudarshan Kumari MD  Hospitalist Service  [facility]  Securely message with the Vocera Web Console (learn more here)  Text page via [employer] Paging/Directory      Clinically Significant Risk Factors Present on Admission                ______________________________________________________________________    Interval History   Seen and examined. No new complaints or issues. Chest tube in place per primary. No abdo pain, still been a couple days since last BM - which is typical for her.    Data reviewed today: I reviewed all medications, new labs and imaging results over the last 24 hours. I personally reviewed no images or EKG's today.    Physical Exam   Vital Signs: Temp: 98.5  F (36.9  C) Temp src: Oral BP: 97/52 Pulse: 71   Resp: 16 SpO2: 95 % O2 Device: None (Room air)    Weight: 106 lbs 11.24 oz    Gen: NAD, very pleasant  HEENT: Normocephalic, EOMI, MMM  Resp: R diminished with some tube noises, no L crackles,  no wheezes, no increased work of resp  CV: S1S2 heard, reg rhythm, reg rate, no pedal edema  Abdo: soft, nontender, nondistended, bowel sounds present  Ext: calves nontender, well perfused  Neuro: AAOx3, CN grossly intact, no facial asymmetry      Data   Recent Labs   Lab 09/09/21  0602 09/07/21  0611 09/06/21  0817 09/04/21  0637 09/03/21  0630   *  --  590*  --  488*   CR  --  0.81  --  0.82  --      Recent  Results (from the past 24 hour(s))   XR Chest Port 1 View    Narrative    EXAM: XR CHEST PORT 1 VIEW  LOCATION: [facility]  DATE/TIME: 9/9/2021 5:20 AM    INDICATION: post op  COMPARISON: 09/08/2021.      Impression    IMPRESSION: Right apical chest tube unchanged. No definite pneumothorax. Small benign calcified granulomata of the left upper lobe and left hilum. Mild scarring/atelectasis of the lower left lung. Stable cardiac silhouette. Extensive emphysema of the   chest wall and base of neck.

## 2021-09-10 NOTE — PLAN OF CARE
A&O x4. VSS on RA. LS diminished. Tolerating reg diet. BS active, passing flatus. Voiding. Incisions CDI. CT in place to water seal w/ crepitus and air leak, dressing changed. Pain controlled with oxycodone, tylenol, and ibuprofen. IND.

## 2021-09-10 NOTE — PLAN OF CARE
[id number]: A&O x4. VSS on RA. Lung sounds dim. Tolerating reg diet. Bowel sounds active + flatus, c/o constipation. Voiding adequately. CT to water seal, air leak and crepitus noted, dressing CDI. Pain controlled with tylenol, ibuprofen, and oxycodone. Up independently.

## 2021-09-10 NOTE — PROGRESS NOTES
[facility]    Medicine Progress Note - Hospitalist Service       Date of Admission:  8/31/2021    Assessment & Plan         Margret Alves is a 76 year old female with PMHx of tobacco use disorder, COPD, insomnia, MDD, polycythemia vera, impaired fasting glucose, and adenocarcinoma RLL admitted on 8/31/2021 and underwent limited right thoracotomy, RL lobectomy with mediastinal LN dissection. Hospitalist service was consulted for medical co-management.      Adenocarcinoma RLL s/p limited right thoracotomy, RL lobectomy with mediastinal LN dissection  Surgery performed 8/31/21 by Dr. Wilcox.   - Defer routine post-operative cares, IVF, DVT prophylaxis and pain control to primary service   - Continue to encourage pulmonary toilet; incentive spirometer at bedside   - Bowel regimen in place while on narcotics   - 9/10 tube remains - mgmt per thoracic surgery - they're considering discharge with pneumostat.  Ok per hospitalist service - will defer to thoracic surgery.     Constipation - resolved  Has history of this when in hospital especially.  No abdo pain or distension, also has not eaten usual amount.   - scheduled and as needed bowel regimen in place, suppository if needed  - 9/6 reports large loose BM last night and 9/7  - 9/8 - 9/10, but no distension or discomfort - states it is fairly normal for her     Concern of cognitive impairment, per family  - OT consulted.  Family does express some concern with cognitive impairment recently       Increased urinary frequency  Per nursing patient has been urinating every 1-2 hours concerning for UTI.  UA was obtained but not suggestive of UTI   - Monitor       COPD. Not decompensated   Mild per recent PFTs per Pulm note 8/24/21.   - Continue PTA Anoro Ellipta and albuterol inhalers      Tobacco use disorder  - Ongoing cessation strongly encouraged     Insomnia  MDD  - PTA Wellbutrin 150 mg po qam  - Trazodone 150 mg at bedtime held by primary team  - consider resuming - discussed with surgery LOUISA, will defer to them - can suppress breathing but benefits may outweigh risks as she is not sleeping     Polycythemia vera  Follows with [facility].   - Maintained on Ruxolitinib     Impaired fasting glucose  HgbA1C 8/26/21 5.9.   - No indication for sliding scale insulin      Hypercholesterolemia  - PTA Lipitor 80 mg po every day       Diet: Diet  Snacks/Supplements Adult: Other; any preferred suppelment as desired (RD); With Meals  Regular Diet Adult    DVT Prophylaxis: Defer to primary service  Curiel Catheter: Not present  Central Lines: None  Code Status: Full Code      Disposition Plan   Expected discharge: defer to primary team     The patient's care was discussed with the Bedside Nurse, Patient and Thoracic surgery LOUISA Consultant.    Sudarshan Kumari MD  Hospitalist Service  [facility]  Securely message with the Vocera Web Console (learn more here)  Text page via [employer] Paging/Directory      Clinically Significant Risk Factors Present on Admission                ______________________________________________________________________    Interval History   Seen and examined this morning. Slept very poorly again. No new issues or complaints. No BM, +flatus, no abdo pain or distension.      Data reviewed today: I reviewed all medications, new labs and imaging results over the last 24 hours. I personally reviewed no images or EKG's today.    Physical Exam   Vital Signs: Temp: 98.2  F (36.8  C) Temp src: Oral BP: 120/62 Pulse: 64   Resp: 16 SpO2: 97 % O2 Device: None (Room air)    Weight: 106 lbs 11.24 oz    Gen: NAD, pleasant  HEENT: Normocephalic, EOMI, MMM  Resp: R sided tube noises, and decrease breath sounds continued, L clear, no crackles,  no wheezes, no increased work of resp  CV: S1S2 heard, reg rhythm, reg rate, no pedal edema  Abdo: soft, nontender, nondistended, bowel sounds present  Ext: calves nontender, well  perfused  Neuro: AAOx3, CN grossly intact, no facial asymmetry      Data   Recent Labs   Lab 09/10/21  0646 09/09/21  0602 09/07/21  0611 09/06/21  0817 09/04/21  0637   PLT  --  726*  --  590*  --    CR 0.98  --  0.81  --  0.82     Recent Results (from the past 24 hour(s))   XR Chest Port 1 View    Narrative    EXAM: XR CHEST PORT 1 VIEW  LOCATION: [facility]  DATE/TIME: 9/10/2021 5:20 AM    INDICATION: Postop.  COMPARISON: 9/9/2021.    FINDINGS: Right chest tube remains in place. No pneumothorax. The heart size is normal. Pneumomediastinum along the left mediastinal border. Calcified granulomas in the left lung. Curvilinear atelectasis at the left lung base. Subcutaneous emphysema over   the chest wall and neck.      Impression    IMPRESSION: No pneumothorax. Persistent pneumomediastinum.

## 2021-09-11 ENCOUNTER — APPOINTMENT (OUTPATIENT)
Dept: GENERAL RADIOLOGY | Facility: CLINIC | Age: 76
DRG: 164 | End: 2021-09-11
Attending: PHYSICIAN ASSISTANT
Payer: COMMERCIAL

## 2021-09-11 PROCEDURE — 120N000001 HC R&B MED SURG/OB

## 2021-09-11 PROCEDURE — 71045 X-RAY EXAM CHEST 1 VIEW: CPT

## 2021-09-11 PROCEDURE — 99232 SBSQ HOSP IP/OBS MODERATE 35: CPT | Performed by: HOSPITALIST

## 2021-09-11 PROCEDURE — 250N000011 HC RX IP 250 OP 636: Performed by: PHYSICIAN ASSISTANT

## 2021-09-11 PROCEDURE — 250N000013 HC RX MED GY IP 250 OP 250 PS 637: Performed by: PHYSICIAN ASSISTANT

## 2021-09-11 PROCEDURE — 250N000013 HC RX MED GY IP 250 OP 250 PS 637: Performed by: THORACIC SURGERY (CARDIOTHORACIC VASCULAR SURGERY)

## 2021-09-11 PROCEDURE — 250N000009 HC RX 250: Performed by: PHYSICIAN ASSISTANT

## 2021-09-11 RX ADMIN — SENNOSIDES AND DOCUSATE SODIUM 1 TABLET: 8.6; 5 TABLET ORAL at 20:34

## 2021-09-11 RX ADMIN — ACETAMINOPHEN 1000 MG: 500 TABLET, FILM COATED ORAL at 18:05

## 2021-09-11 RX ADMIN — ACETAMINOPHEN 1000 MG: 500 TABLET, FILM COATED ORAL at 07:37

## 2021-09-11 RX ADMIN — FAMOTIDINE 20 MG: 20 TABLET ORAL at 08:44

## 2021-09-11 RX ADMIN — IBUPROFEN 600 MG: 600 TABLET ORAL at 13:03

## 2021-09-11 RX ADMIN — OXYCODONE HYDROCHLORIDE 5 MG: 5 TABLET ORAL at 07:37

## 2021-09-11 RX ADMIN — IBUPROFEN 600 MG: 600 TABLET ORAL at 23:28

## 2021-09-11 RX ADMIN — LIDOCAINE 1 PATCH: 560 PATCH PERCUTANEOUS; TOPICAL; TRANSDERMAL at 07:38

## 2021-09-11 RX ADMIN — BUPROPION HYDROCHLORIDE 150 MG: 150 TABLET, FILM COATED, EXTENDED RELEASE ORAL at 08:44

## 2021-09-11 RX ADMIN — ATORVASTATIN CALCIUM 80 MG: 40 TABLET, FILM COATED ORAL at 20:34

## 2021-09-11 RX ADMIN — OXYCODONE HYDROCHLORIDE 5 MG: 5 TABLET ORAL at 20:40

## 2021-09-11 RX ADMIN — SENNOSIDES AND DOCUSATE SODIUM 1 TABLET: 8.6; 5 TABLET ORAL at 08:44

## 2021-09-11 RX ADMIN — OXYCODONE HYDROCHLORIDE 5 MG: 5 TABLET ORAL at 15:32

## 2021-09-11 RX ADMIN — IBUPROFEN 600 MG: 600 TABLET ORAL at 07:37

## 2021-09-11 RX ADMIN — UMECLIDINIUM BROMIDE AND VILANTEROL TRIFENATATE 1 PUFF: 62.5; 25 POWDER RESPIRATORY (INHALATION) at 08:59

## 2021-09-11 RX ADMIN — ENOXAPARIN SODIUM 40 MG: 40 INJECTION SUBCUTANEOUS at 08:44

## 2021-09-11 RX ADMIN — TRAZODONE HYDROCHLORIDE 150 MG: 100 TABLET ORAL at 20:34

## 2021-09-11 RX ADMIN — BACITRACIN: 500 OINTMENT TOPICAL at 11:01

## 2021-09-11 RX ADMIN — IBUPROFEN 600 MG: 600 TABLET ORAL at 18:04

## 2021-09-11 RX ADMIN — ACETAMINOPHEN 1000 MG: 500 TABLET, FILM COATED ORAL at 13:03

## 2021-09-11 ASSESSMENT — ACTIVITIES OF DAILY LIVING (ADL)
ADLS_ACUITY_SCORE: 16

## 2021-09-11 NOTE — PROGRESS NOTES
[facility]    Medicine Progress Note - Hospitalist Service       Date of Admission:  8/31/2021    Assessment & Plan         Margret Alves is a 76 year old female with PMHx of tobacco use disorder, COPD, insomnia, MDD, polycythemia vera, impaired fasting glucose, and adenocarcinoma RLL admitted on 8/31/2021 and underwent limited right thoracotomy, RL lobectomy with mediastinal LN dissection. Hospitalist service was consulted for medical co-management.      Adenocarcinoma RLL s/p limited right thoracotomy, RL lobectomy with mediastinal LN dissection  Surgery performed 8/31/21 by Dr. Wilcox.   - Defer routine post-operative cares, IVF, DVT prophylaxis and pain control to primary service   - Continue to encourage pulmonary toilet; incentive spirometer at bedside   - Bowel regimen in place while on narcotics   - 9/11 tube remains - mgmt per thoracic surgery - they're considering discharge with pneumostat.  Ok per hospitalist service - will defer to thoracic surgery.     Constipation - resolved  Has history of this when in hospital especially.  No abdo pain or distension, also has not eaten usual amount.   - scheduled and as needed bowel regimen in place, suppository if needed  - 9/6 reports large loose BM last night and 9/7  - 9/8 - 9/11, but no distension or discomfort - states it is fairly normal for her     Concern of cognitive impairment, per family  - OT consulted.  Family does express some concern with cognitive impairment recently       Increased urinary frequency  Per nursing patient has been urinating every 1-2 hours concerning for UTI.  UA was obtained but not suggestive of UTI   - Monitor       COPD. Not decompensated   Mild per recent PFTs per Pulm note 8/24/21.   - Continue PTA Anoro Ellipta and albuterol inhalers      Tobacco use disorder  - Ongoing cessation strongly encouraged     Insomnia  MDD  - PTA Wellbutrin 150 mg po qam  - Trazodone 150 mg at bedtime held by primary  team - consider resuming - discussed with surgery LOUISA, will defer to them - can suppress breathing but benefits may outweigh risks as she is not sleeping     Polycythemia vera  Follows with [facility].   - Maintained on Ruxolitinib     Impaired fasting glucose  HgbA1C 8/26/21 5.9.   - No indication for sliding scale insulin      Hypercholesterolemia  - PTA Lipitor 80 mg po every day       Diet: Diet  Snacks/Supplements Adult: Other; any preferred suppelment as desired (RD); With Meals  Regular Diet Adult    DVT Prophylaxis: Defer to primary service  Curiel Catheter: Not present  Central Lines: None  Code Status: Full Code      Disposition Plan   Expected discharge: defer to primary team     The patient's care was discussed with the Bedside Nurse and Patient.    Sudarshan Kumari MD  Hospitalist Service  [facility]  Securely message with the Vocera Web Console (learn more here)  Text page via [employer] Paging/Directory      Clinically Significant Risk Factors Present on Admission                ______________________________________________________________________    Interval History   Patient seen and examined earlier today.  No new pain but she is understandably anxious to leave the hospital so she can.  Denies shortness of breath, fevers, abdominal pain.  No BM but again this is fairly typical for her.  Bowel regimen in place.  Overall plan and timing at discretion of primary team-thoracic surgery.    Data reviewed today: I reviewed all medications, new labs and imaging results over the last 24 hours. I personally reviewed no images or EKG's today.    Physical Exam   Vital Signs: Temp: 98.1  F (36.7  C) Temp src: Oral BP: 128/77 Pulse: 86   Resp: 16 SpO2: 96 % O2 Device: None (Room air)    Weight: 106 lbs 11.24 oz    Gen: NAD, pleasant  HEENT: Normocephalic, EOMI, MMM  Resp: Diminished in bases, otherwise clear on the left right side with some sounds from tube, no focal crackles or  wheezes  CV: S1S2 heard, reg rhythm, reg rate, no pedal edema  Abdo: soft, nontender, nondistended, bowel sounds present  Ext: calves nontender, well perfused  Neuro: AAOx3, CN grossly intact, no facial asymmetry      Data   Recent Labs   Lab 09/10/21  0646 09/09/21  0602 09/07/21  0611 09/06/21  0817   PLT  --  726*  --  590*   CR 0.98  --  0.81  --      Recent Results (from the past 24 hour(s))   XR Chest Port 1 View    Narrative    CHEST ONE VIEW PORTABLE   9/11/2021 6:20 AM     HISTORY: Chest tube.    COMPARISON: 9/10/2021.      Impression    IMPRESSION: Large bore right-sided chest tube unchanged. No  pneumothorax or pleural effusion on either side. No airspace  consolidation. Calcified granuloma noted in the left lung. Extensive  bilateral subcutaneous emphysema not significantly changed.     DONNA CUELLO MD         SYSTEM ID:  [id number]

## 2021-09-11 NOTE — PLAN OF CARE
POD#10 Right thoracotomy, RL lobectomy with mediastinal lymph node dissection. A&Ox4. VSS on RA. Up Independently. Regular diet. C/o right back/chest pain, managed with scheduled Tylenol, Ibuprofen and PRN Oxycodone. CT to water seal. Air leak and crepitus present. LS diminished, coarse/ tubular. States some SCOTT. Dressing CDI. Voiding bathroom. IV-SL. Discharge pending. Continue to monitor.

## 2021-09-11 NOTE — PROGRESS NOTES
"Thoracic Surgery POD 11    No complaints    /77 (BP Location: Left arm)   Pulse 86   Temp 98.1  F (36.7  C) (Oral)   Resp 16   Ht 1.511 m (4' 11.49\")   Wt 48.4 kg (106 lb 11.2 oz)   SpO2 96%   BMI 21.20 kg/m    NAD  Lungs: respirations non labored, chest tube in place right side with intermittent airleak with phonation.   Heart: regular rate and rhythm  Abdomen: non tender  Extremities: warm and well perfused  Neuro: awake, alert, moving all extremities and conversant    CXR looks good    A/P: POD 11 Right Lower Lobectomy with prolonged airleak.     Continue chest tube to water seal, await resolution of airleak    Donnie Cameron MD  Thoracic Surgery/[facility]  Office [phone]        "

## 2021-09-11 NOTE — PLAN OF CARE
Reason for Admission: POD# 11 of R thoracotomy, Right LL lobectomy with mediastinal lymph node dissection.      AOx4. VSS on RA. L/s coarse. Encouraged IS. Chest tube to water seal, had 20ml out put this shift. Air leak with crepitus to right back, neck, and arm. MD aware. C tube site dressing changed. Pain managed with prn Oxycodone and scheduled Ibuprofen and Tylenol. Up in chair for meals. Reg diet. Ambulated in halls x4. Up independent. Awaiting resolution of air leak.

## 2021-09-11 NOTE — PLAN OF CARE
VSS. A&O x4. Declined pain med overnight. Slept between cares. Chest tube to water seal, air leak present. Crepitus on back, arm and chest. Crackles in RLL. Up independently in room.

## 2021-09-12 ENCOUNTER — APPOINTMENT (OUTPATIENT)
Dept: GENERAL RADIOLOGY | Facility: CLINIC | Age: 76
DRG: 164 | End: 2021-09-12
Attending: PHYSICIAN ASSISTANT
Payer: COMMERCIAL

## 2021-09-12 LAB — PLATELET # BLD AUTO: 746 10E3/UL (ref 150–450)

## 2021-09-12 PROCEDURE — 250N000013 HC RX MED GY IP 250 OP 250 PS 637: Performed by: PHYSICIAN ASSISTANT

## 2021-09-12 PROCEDURE — 36415 COLL VENOUS BLD VENIPUNCTURE: CPT | Performed by: PHYSICIAN ASSISTANT

## 2021-09-12 PROCEDURE — 250N000013 HC RX MED GY IP 250 OP 250 PS 637: Performed by: THORACIC SURGERY (CARDIOTHORACIC VASCULAR SURGERY)

## 2021-09-12 PROCEDURE — 250N000011 HC RX IP 250 OP 636: Performed by: PHYSICIAN ASSISTANT

## 2021-09-12 PROCEDURE — 85049 AUTOMATED PLATELET COUNT: CPT | Performed by: PHYSICIAN ASSISTANT

## 2021-09-12 PROCEDURE — 120N000001 HC R&B MED SURG/OB

## 2021-09-12 PROCEDURE — 99232 SBSQ HOSP IP/OBS MODERATE 35: CPT | Performed by: HOSPITALIST

## 2021-09-12 PROCEDURE — 71045 X-RAY EXAM CHEST 1 VIEW: CPT

## 2021-09-12 RX ADMIN — OXYCODONE HYDROCHLORIDE 10 MG: 5 TABLET ORAL at 16:40

## 2021-09-12 RX ADMIN — SENNOSIDES AND DOCUSATE SODIUM 1 TABLET: 8.6; 5 TABLET ORAL at 08:12

## 2021-09-12 RX ADMIN — BUPROPION HYDROCHLORIDE 150 MG: 150 TABLET, FILM COATED, EXTENDED RELEASE ORAL at 08:12

## 2021-09-12 RX ADMIN — LIDOCAINE 1 PATCH: 560 PATCH PERCUTANEOUS; TOPICAL; TRANSDERMAL at 08:12

## 2021-09-12 RX ADMIN — ACETAMINOPHEN 1000 MG: 500 TABLET, FILM COATED ORAL at 20:46

## 2021-09-12 RX ADMIN — FAMOTIDINE 20 MG: 20 TABLET ORAL at 08:12

## 2021-09-12 RX ADMIN — ATORVASTATIN CALCIUM 80 MG: 40 TABLET, FILM COATED ORAL at 20:47

## 2021-09-12 RX ADMIN — OXYCODONE HYDROCHLORIDE 5 MG: 5 TABLET ORAL at 07:35

## 2021-09-12 RX ADMIN — IBUPROFEN 600 MG: 600 TABLET ORAL at 17:54

## 2021-09-12 RX ADMIN — SENNOSIDES AND DOCUSATE SODIUM 1 TABLET: 8.6; 5 TABLET ORAL at 20:47

## 2021-09-12 RX ADMIN — ACETAMINOPHEN 1000 MG: 500 TABLET, FILM COATED ORAL at 15:01

## 2021-09-12 RX ADMIN — BACITRACIN: 500 OINTMENT TOPICAL at 10:15

## 2021-09-12 RX ADMIN — UMECLIDINIUM BROMIDE AND VILANTEROL TRIFENATATE 1 PUFF: 62.5; 25 POWDER RESPIRATORY (INHALATION) at 08:16

## 2021-09-12 RX ADMIN — ENOXAPARIN SODIUM 40 MG: 40 INJECTION SUBCUTANEOUS at 08:12

## 2021-09-12 RX ADMIN — OXYCODONE HYDROCHLORIDE 5 MG: 5 TABLET ORAL at 12:40

## 2021-09-12 RX ADMIN — TRAZODONE HYDROCHLORIDE 150 MG: 100 TABLET ORAL at 20:47

## 2021-09-12 RX ADMIN — POLYETHYLENE GLYCOL 3350 17 G: 17 POWDER, FOR SOLUTION ORAL at 08:12

## 2021-09-12 RX ADMIN — IBUPROFEN 600 MG: 600 TABLET ORAL at 12:38

## 2021-09-12 ASSESSMENT — ACTIVITIES OF DAILY LIVING (ADL)
ADLS_ACUITY_SCORE: 16

## 2021-09-12 NOTE — PROGRESS NOTES
RN took over care of pt from [id number]. VSS. Pain at 5/10 at chest tube site. Chest tube to water seal suction, w air leak. MD aware. Crepitus noted. Lungs coarse in R lobes near chest tube site - unchanged from day shift. Pt ambulating independently in room. Uses call light appropriately.

## 2021-09-12 NOTE — PLAN OF CARE
A/Ox4. VSS. RA. IND in room. Regular diet, tolerating well. Denies nausea, vomiting, or shortness of breath. Rates pain upwards of 7/10 for chest and back: continuous sharp, pressure, throbbing;  has been taking scheduled ibuprofen and/or tylenol and PRN oxycodone 5mg which she states help her. One chest tube to water seal with KNOWN air leak (awaiting a solution for this); dressing changed today with little serous drainage, now CDI new dressing. Steri strips to upper rt back. Right upper and middle lung sounds coarse and crackles. Saline locked PIV. Has been using the IS, patient states she isnt using as much as she should; encouraged the use.     Afternoon: patient decided to take scheduled tylenol, talked with her a little more about her pain control and decided we will start 10mg this evening as she states she is always in pain, she can get up and do one thing then come sit back down, then repeat but constantly looks uncomfortable everytime I go in and assess her pain so we will try a new plan, pt is agreeable.    Pt expressed huge dissatisfaction with food from the hospital so she said it leads her to not eat, so I gave her an outside vendor menu to order out from, hoping to encourage more oral intake at this time besides liquids.

## 2021-09-12 NOTE — PROGRESS NOTES
VS WNL, ORA. A/Ox4. Lobectomy incision with dried drainage on steri strips. Pain controlled with oxycodone and ibuprofen. Patient sleeping during possible oxycodone dosing and allowed to continue without interruption. Up independently. Diet regular, enjoys apple juice with copious amounts of ice. BS active. Voiding adequately and independently. LS coarse. MD aware of continuing air leak and crepitus. Possibly discharge home with pneumostat.

## 2021-09-12 NOTE — PROGRESS NOTES
[facility]    Medicine Progress Note - Hospitalist Service       Date of Admission:  8/31/2021    Assessment & Plan         Margret Alves is a 76 year old female with PMHx of tobacco use disorder, COPD, insomnia, MDD, polycythemia vera, impaired fasting glucose, and adenocarcinoma RLL admitted on 8/31/2021 and underwent limited right thoracotomy, RL lobectomy with mediastinal LN dissection. Hospitalist service was consulted for medical co-management.      Adenocarcinoma RLL s/p limited right thoracotomy, RL lobectomy with mediastinal LN dissection  Surgery performed 8/31/21 by Dr. Wilcox.   - Defer routine post-operative cares, IVF, DVT prophylaxis and pain control to primary service   - Continue to encourage pulmonary toilet; incentive spirometer at bedside   - Bowel regimen in place while on narcotics   - 9/12 tube remains - mgmt per thoracic surgery      Constipation - resolved  Has history of this when in hospital especially.  No abdo pain or distension, also has not eaten usual amount.   - scheduled and as needed bowel regimen in place, suppository if needed  - 9/6 reports large loose BM last night and 9/7  - 9/8 - 9/11, but no distension or discomfort - states it is fairly normal for her     Concern of cognitive impairment, per family  - OT consulted.  Family does express some concern with cognitive impairment recently       Increased urinary frequency  Per nursing patient has been urinating every 1-2 hours concerning for UTI.  UA was obtained but not suggestive of UTI   - Monitor       COPD. Not decompensated   Mild per recent PFTs per Pulm note 8/24/21.   - Continue PTA Anoro Ellipta and albuterol inhalers      Tobacco use disorder  - Ongoing cessation strongly encouraged     Insomnia  MDD  - PTA Wellbutrin 150 mg po qam  - Trazodone 150 mg at bedtime held by primary team - resumed since     Polycythemia vera  Follows with [facility].   - Maintained on  Ruxolitinib     Impaired fasting glucose  HgbA1C 8/26/21 5.9.   - No indication for sliding scale insulin      Hypercholesterolemia  - PTA Lipitor 80 mg po every day     Diet: Diet  Snacks/Supplements Adult: Other; any preferred suppelment as desired (RD); With Meals  Regular Diet Adult    DVT Prophylaxis: Defer to primary service  Curiel Catheter: Not present  Central Lines: None  Code Status: Full Code      Disposition Plan   Expected discharge: defer to primary      The patient's care was discussed with the Bedside Nurse.    Sudarshan Kumari MD  Hospitalist Service  [facility]  Securely message with the Vocera Web Console (learn more here)  Text page via [employer] Paging/Directory      Clinically Significant Risk Factors Present on Admission                ______________________________________________________________________    Interval History   Patient resting comfortably - per RN no new issues or concerns. No pain or new sob. No fevers.    Data reviewed today: I reviewed all medications, new labs and imaging results over the last 24 hours. I personally reviewed no images or EKG's today.    Physical Exam   Vital Signs: Temp: 98.2  F (36.8  C) Temp src: Oral BP: (!) 141/67 Pulse: 82   Resp: 16 SpO2: 95 % O2 Device: None (Room air)    Weight: 106 lbs 11.24 oz    Gen: NAD, comfortable  HEENT: Normocephalic, EOMI, MMM  Resp: no audible wheezes, no increased work of resp  Abdo: nondistended  Ext: perfused  Neuro: moving all ext, no focal deficits      Data   Recent Labs   Lab 09/12/21  0710 09/10/21  0646 09/09/21  0602 09/07/21  0611 09/06/21  0817   *  --  726*  --  590*   CR  --  0.98  --  0.81  --      Recent Results (from the past 24 hour(s))   XR Chest Port 1 View    Narrative    EXAM: XR CHEST PORTABLE 1 VIEW  LOCATION: [facility]  DATE/TIME: 09/12/2021, 5:00 AM    INDICATION: Postop.  COMPARISON: 09/11/2021.    FINDINGS: Right chest tube remains in  place. Small pneumothorax at the apex measuring approximately 7 mm. Increasing infiltrate in the right lung laterally. Persistent pneumomediastinum. Calcified granuloma in the left lung. Extensive subcutaneous   emphysema over the chest and neck.      Impression    IMPRESSION: Small right apical pneumothorax.

## 2021-09-12 NOTE — PROGRESS NOTES
"Thoracic Surgery POD 12     No complaints     BP (!) 140/61 (BP Location: Right arm)   Pulse 82   Temp 97.2  F (36.2  C) (Axillary)   Resp 16   Ht 1.511 m (4' 11.49\")   Wt 48.4 kg (106 lb 11.2 oz)   SpO2 93%   BMI 21.20 kg/m      NAD  Lungs: respirations non labored, chest tube in place right side with intermittent airleak with phonation.   Heart: regular rate and rhythm  Abdomen: non tender  Extremities: warm and well perfused  Neuro: awake, alert, moving all extremities and conversant     CXR looks good     A/P: POD 12 Right Lower Lobectomy with prolonged airleak.      Continue chest tube to water seal, await resolution of airleak     Donnie Cameron MD  Thoracic Surgery/[facility]  Office [phone]  "

## 2021-09-13 ENCOUNTER — APPOINTMENT (OUTPATIENT)
Dept: GENERAL RADIOLOGY | Facility: CLINIC | Age: 76
DRG: 164 | End: 2021-09-13
Attending: PHYSICIAN ASSISTANT
Payer: COMMERCIAL

## 2021-09-13 LAB
CREAT SERPL-MCNC: 0.86 MG/DL (ref 0.52–1.04)
GFR SERPL CREATININE-BSD FRML MDRD: 67 ML/MIN/1.73M2

## 2021-09-13 PROCEDURE — 36415 COLL VENOUS BLD VENIPUNCTURE: CPT | Performed by: THORACIC SURGERY (CARDIOTHORACIC VASCULAR SURGERY)

## 2021-09-13 PROCEDURE — 250N000013 HC RX MED GY IP 250 OP 250 PS 637: Performed by: PHYSICIAN ASSISTANT

## 2021-09-13 PROCEDURE — 99232 SBSQ HOSP IP/OBS MODERATE 35: CPT | Performed by: HOSPITALIST

## 2021-09-13 PROCEDURE — 71045 X-RAY EXAM CHEST 1 VIEW: CPT

## 2021-09-13 PROCEDURE — 120N000001 HC R&B MED SURG/OB

## 2021-09-13 PROCEDURE — 250N000011 HC RX IP 250 OP 636: Performed by: PHYSICIAN ASSISTANT

## 2021-09-13 PROCEDURE — 82565 ASSAY OF CREATININE: CPT | Performed by: THORACIC SURGERY (CARDIOTHORACIC VASCULAR SURGERY)

## 2021-09-13 PROCEDURE — 250N000013 HC RX MED GY IP 250 OP 250 PS 637: Performed by: THORACIC SURGERY (CARDIOTHORACIC VASCULAR SURGERY)

## 2021-09-13 RX ADMIN — FAMOTIDINE 20 MG: 20 TABLET ORAL at 08:19

## 2021-09-13 RX ADMIN — SENNOSIDES AND DOCUSATE SODIUM 1 TABLET: 8.6; 5 TABLET ORAL at 20:33

## 2021-09-13 RX ADMIN — OXYCODONE HYDROCHLORIDE 5 MG: 5 TABLET ORAL at 17:08

## 2021-09-13 RX ADMIN — OXYCODONE HYDROCHLORIDE 5 MG: 5 TABLET ORAL at 11:14

## 2021-09-13 RX ADMIN — SENNOSIDES AND DOCUSATE SODIUM 1 TABLET: 8.6; 5 TABLET ORAL at 08:19

## 2021-09-13 RX ADMIN — LIDOCAINE 1 PATCH: 560 PATCH PERCUTANEOUS; TOPICAL; TRANSDERMAL at 08:18

## 2021-09-13 RX ADMIN — ACETAMINOPHEN 1000 MG: 500 TABLET, FILM COATED ORAL at 16:24

## 2021-09-13 RX ADMIN — POLYETHYLENE GLYCOL 3350 17 G: 17 POWDER, FOR SOLUTION ORAL at 08:19

## 2021-09-13 RX ADMIN — BACITRACIN: 500 OINTMENT TOPICAL at 08:22

## 2021-09-13 RX ADMIN — ENOXAPARIN SODIUM 40 MG: 40 INJECTION SUBCUTANEOUS at 08:18

## 2021-09-13 RX ADMIN — OXYCODONE HYDROCHLORIDE 5 MG: 5 TABLET ORAL at 20:33

## 2021-09-13 RX ADMIN — ATORVASTATIN CALCIUM 80 MG: 40 TABLET, FILM COATED ORAL at 20:32

## 2021-09-13 RX ADMIN — IBUPROFEN 600 MG: 600 TABLET ORAL at 06:10

## 2021-09-13 RX ADMIN — BUPROPION HYDROCHLORIDE 150 MG: 150 TABLET, FILM COATED, EXTENDED RELEASE ORAL at 08:19

## 2021-09-13 RX ADMIN — ACETAMINOPHEN 1000 MG: 500 TABLET, FILM COATED ORAL at 20:33

## 2021-09-13 RX ADMIN — TRAZODONE HYDROCHLORIDE 150 MG: 100 TABLET ORAL at 20:33

## 2021-09-13 RX ADMIN — ACETAMINOPHEN 1000 MG: 500 TABLET, FILM COATED ORAL at 08:19

## 2021-09-13 RX ADMIN — UMECLIDINIUM BROMIDE AND VILANTEROL TRIFENATATE 1 PUFF: 62.5; 25 POWDER RESPIRATORY (INHALATION) at 08:21

## 2021-09-13 RX ADMIN — OXYCODONE HYDROCHLORIDE 5 MG: 5 TABLET ORAL at 06:09

## 2021-09-13 ASSESSMENT — ACTIVITIES OF DAILY LIVING (ADL)
ADLS_ACUITY_SCORE: 16

## 2021-09-13 NOTE — PLAN OF CARE
VSS, RA. Chest tube to water seal w known air leak, crepitus. Pt really wanting to discharge home. Pt wanting to sleep tonight, not wanting to be woken up for scheduled tylenol or ibuprofen. Independent in room, steady on feet. Call light within reach, bed in lowest position. Pt using call light appropriately.    Chest xray yesterday AM showing 7mmR apical pneumothorax. Today increased to 9mm

## 2021-09-13 NOTE — PROGRESS NOTES
"Thoracic Surgery POD #13:  BP (!) 144/83 (BP Location: Right arm)   Pulse 100   Temp 98.5  F (36.9  C) (Oral)   Resp 16   Ht 1.511 m (4' 11.49\")   Wt 48.4 kg (106 lb 11.2 oz)   SpO2 96%   BMI 21.20 kg/m    CXR: lungs expanded, subcutaneous emphysema right chest stable  CT: minimal serous output    S: Tired of being in the hospital. Discussed prolonged air leak and awaiting resolution to allow CT removal and safe discharge. No other complaints.  O: CT: small intermittent air leak with phonation and cough but less as compared to last Friday so improving over time  Continue chest tube to water seal  D/W RN    ELIN SheikhC with Dr. [facility]  Cell [phone]        "

## 2021-09-13 NOTE — PLAN OF CARE
VSS. A/O. CT with intermittent leaks only when cough, crepitus chest, upper back & neck area, MD aware. CT to water seal. Oxy/tylenol given. Incision CDI. Tolerating diet. Voiding own.

## 2021-09-13 NOTE — PROGRESS NOTES
[facility]    Medicine Progress Note - Hospitalist Service       Date of Admission:  8/31/2021    Assessment & Plan         Margret Alves is a 76 year old female with PMHx of tobacco use disorder, COPD, insomnia, MDD, polycythemia vera, impaired fasting glucose, and adenocarcinoma RLL admitted on 8/31/2021 and underwent limited right thoracotomy, RL lobectomy with mediastinal LN dissection. Hospitalist service was consulted for medical co-management.      Adenocarcinoma RLL s/p limited right thoracotomy, RL lobectomy with mediastinal LN dissection  Surgery performed 8/31/21 by Dr. Wilcox.   - Defer routine post-operative cares, IVF, DVT prophylaxis and pain control to primary service   - Continue to encourage pulmonary toilet; incentive spirometer at bedside   - Bowel regimen in place while on narcotics   - 9/13 tube remains - mgmt per thoracic surgery      Constipation - resolved  Has history of this when in hospital especially.  No abdo pain or distension, also has not eaten usual amount.   - scheduled and as needed bowel regimen in place, suppository if needed  - 9/6 reports large loose BM last night and 9/7  - 9/8 - 9/12, but no distension or discomfort - states it is fairly normal for her     Concern of cognitive impairment, per family  - OT consulted.  Family does express some concern with cognitive impairment recently       Increased urinary frequency  Per nursing patient has been urinating every 1-2 hours concerning for UTI.  UA was obtained but not suggestive of UTI   - Monitor       COPD. Not decompensated   Mild per recent PFTs per Pulm note 8/24/21.   - Continue PTA Anoro Ellipta and albuterol inhalers      Tobacco use disorder  - Ongoing cessation strongly encouraged     Insomnia  MDD  - PTA Wellbutrin 150 mg po qam  - Trazodone 150 mg at bedtime held by primary team - resumed since     Polycythemia vera  Follows with [facility].   - Maintained on  Ruxolitinib     Impaired fasting glucose  HgbA1C 8/26/21 5.9.   - No indication for sliding scale insulin      Hypercholesterolemia  - PTA Lipitor 80 mg po every day          Diet: Diet  Snacks/Supplements Adult: Other; any preferred suppelment as desired (RD); With Meals  Regular Diet Adult    DVT Prophylaxis: Defer to primary service  Curiel Catheter: Not present  Central Lines: None  Code Status: Full Code      Disposition Plan   Expected discharge: defer to primary service, chest tube in place     The patient's care was discussed with the Bedside Nurse, Patient and Patient's Family.    Sudarshan Kumari MD  Hospitalist Service  [facility]  Securely message with the Vocera Web Console (learn more here)  Text page via [employer] Paging/Directory      Clinically Significant Risk Factors Present on Admission                ______________________________________________________________________    Interval History   Patient seen and examined this morning.  Patient sister present at bedside advised some opinions of modern hospital practices regarding food delivery.  I reiterated that I will speak with nursing staff and we will do our best to accommodate needs and requests.  Patient denies any new complaints or issues and is understandably frustrated with how long the hospitalization has been.  No new shortness of breath, fevers, or pain.  Denies abdominal pain, distention, nausea or vomiting.  She is passing gas but has not had a bowel movement.  She is not concerned about this.  Bowel regimen is in place.    Data reviewed today: I reviewed all medications, new labs and imaging results over the last 24 hours. I personally reviewed no images or EKG's today.    Physical Exam   Vital Signs: Temp: 98.5  F (36.9  C) Temp src: Oral BP: (!) 144/83 Pulse: 100   Resp: 16 SpO2: 96 % O2 Device: None (Room air)    Weight: 106 lbs 11.24 oz    Gen: NAD, pleasant  HEENT: Normocephalic, EOMI, MMM  Resp: R sided  tube sounds, no focal crackles, L side clear,  no wheezes, no increased work of resp  CV: S1S2 heard, reg rhythm, reg rate, no pedal edema  Abdo: soft, nontender, nondistended, bowel sounds present  Ext: calves nontender, well perfused  Neuro: AAOx3, CN grossly intact, no facial asymmetry      Data   Recent Labs   Lab 09/13/21  0646 09/12/21  0710 09/10/21  0646 09/09/21  0602 09/07/21  0611   PLT  --  746*  --  726*  --    CR 0.86  --  0.98  --  0.81     Recent Results (from the past 24 hour(s))   XR Chest Port 1 View    Narrative    EXAM: XR CHEST PORT 1 VIEW  LOCATION: [facility]  DATE/TIME: 9/13/2021 4:20 AM    INDICATION: Postop.  COMPARISON: 9/12/2021.    FINDINGS: Right chest tube remains in place. Small right apical pneumothorax measuring 9 mm, not significantly changed. Mild atelectasis or scarring at the lung bases. Calcified granuloma in the left lung. The heart size is normal. Extensive subcutaneous   emphysema over the chest and neck.      Impression    IMPRESSION: Small right apical pneumothorax.

## 2021-09-13 NOTE — PROGRESS NOTES
"CLINICAL NUTRITION SERVICES - REASSESSMENT NOTE      Future/Additional Recommendations:     Strongly encouraged pt to try to eat at least 2 meals per day (either from the menu or brought in)   Malnutrition: (9/13)  % Weight Loss:  None noted  % Intake:  <75% for > 7 days (non-severe malnutrition)  Subcutaneous Fat Loss:  None observed  Muscle Loss:  None observed  Fluid Retention:  None noted    Malnutrition Diagnosis: Patient does not meet two of the above criteria necessary for diagnosing malnutrition       EVALUATION OF PROGRESS TOWARD GOALS   Diet:    Regular    Intake/Tolerance:    Chart reviewed  Pt still with CT  Visited with pt this morning - \"I REALLY want to go home today!\"  Notes that she is sick of the menu and doesn't want to order anything - \"I just want to go home\"  Has a container of brownies at bedside (50% eaten)  Typically not a breakfast eater - likes to have a cup of coffee  She ordered from [employer] last night - \"really good pasta dish\"  States that her sister is visiting today and bringing her lunch ([employer])  Pt does not like nutrition supplements  Overall, anticipate decreased po intake over the past week      NEW FINDINGS:     Wt appears relatively stable    Vitals:    08/31/21 0635 08/31/21 0637 09/07/21 1715   Weight: 108.2 kg (238 lb 8.6 oz) 49.1 kg (108 lb 3.2 oz) 48.4 kg (106 lb 11.2 oz)     8/9/21: 109#  1/14/21: 107#    Previous Goals (9/7):   Patient will consume >50% nutritionally adequate meals TID vs the equivalent with protein supplements   Evaluation: Not met    Previous Nutrition Diagnosis (9/7):   Inadequate oral intake related to suspected variable appetite as evidenced by intakes 50-75% of small, nutritionally inadequate meals 2-3x/day   Evaluation: No change, modified below      MALNUTRITION  % Weight Loss:  None noted  % Intake:  <75% for > 7 days (non-severe malnutrition)  Subcutaneous Fat Loss:  None observed  Muscle Loss:  None observed  Fluid Retention:  " None noted    Malnutrition Diagnosis: Patient does not meet two of the above criteria necessary for diagnosing malnutrition      CURRENT NUTRITION DIAGNOSIS  Inadequate oral intake related to dislike of hospital menu/food as evidenced by pt has not been ordering meals from menu and now having some meals brought in    INTERVENTIONS  Recommendations / Nutrition Prescription  Regular diet    Implementation  Strongly encouraged pt to try to eat at least 2 meals per day (either from the menu or brought in)    Goals  Pt to consume 2 meals/day      MONITORING AND EVALUATION:  Progress towards goals will be monitored and evaluated per protocol and Practice Guidelines

## 2021-09-14 ENCOUNTER — APPOINTMENT (OUTPATIENT)
Dept: GENERAL RADIOLOGY | Facility: CLINIC | Age: 76
DRG: 164 | End: 2021-09-14
Attending: PHYSICIAN ASSISTANT
Payer: COMMERCIAL

## 2021-09-14 PROCEDURE — 120N000001 HC R&B MED SURG/OB

## 2021-09-14 PROCEDURE — 99232 SBSQ HOSP IP/OBS MODERATE 35: CPT | Performed by: HOSPITALIST

## 2021-09-14 PROCEDURE — 250N000011 HC RX IP 250 OP 636: Performed by: PHYSICIAN ASSISTANT

## 2021-09-14 PROCEDURE — 250N000013 HC RX MED GY IP 250 OP 250 PS 637: Performed by: PHYSICIAN ASSISTANT

## 2021-09-14 PROCEDURE — 99207 PR CDG-MDM COMPONENT: MEETS MODERATE - UP CODED: CPT | Performed by: HOSPITALIST

## 2021-09-14 PROCEDURE — 250N000013 HC RX MED GY IP 250 OP 250 PS 637: Performed by: THORACIC SURGERY (CARDIOTHORACIC VASCULAR SURGERY)

## 2021-09-14 PROCEDURE — 250N000011 HC RX IP 250 OP 636: Performed by: HOSPITALIST

## 2021-09-14 PROCEDURE — U0003 INFECTIOUS AGENT DETECTION BY NUCLEIC ACID (DNA OR RNA); SEVERE ACUTE RESPIRATORY SYNDROME CORONAVIRUS 2 (SARS-COV-2) (CORONAVIRUS DISEASE [COVID-19]), AMPLIFIED PROBE TECHNIQUE, MAKING USE OF HIGH THROUGHPUT TECHNOLOGIES AS DESCRIBED BY CMS-2020-01-R: HCPCS | Performed by: HOSPITALIST

## 2021-09-14 PROCEDURE — 71045 X-RAY EXAM CHEST 1 VIEW: CPT

## 2021-09-14 RX ORDER — HYDROMORPHONE HCL IN WATER/PF 6 MG/30 ML
0.2 PATIENT CONTROLLED ANALGESIA SYRINGE INTRAVENOUS ONCE
Status: COMPLETED | OUTPATIENT
Start: 2021-09-14 | End: 2021-09-14

## 2021-09-14 RX ORDER — AMOXICILLIN 250 MG
3 CAPSULE ORAL 2 TIMES DAILY
Status: DISCONTINUED | OUTPATIENT
Start: 2021-09-14 | End: 2021-09-16 | Stop reason: HOSPADM

## 2021-09-14 RX ADMIN — HYDROMORPHONE HYDROCHLORIDE 0.2 MG: 0.2 INJECTION, SOLUTION INTRAMUSCULAR; INTRAVENOUS; SUBCUTANEOUS at 06:49

## 2021-09-14 RX ADMIN — IBUPROFEN 600 MG: 600 TABLET ORAL at 23:23

## 2021-09-14 RX ADMIN — ACETAMINOPHEN 1000 MG: 500 TABLET, FILM COATED ORAL at 03:52

## 2021-09-14 RX ADMIN — SENNOSIDES AND DOCUSATE SODIUM 1 TABLET: 8.6; 5 TABLET ORAL at 08:22

## 2021-09-14 RX ADMIN — LIDOCAINE 1 PATCH: 560 PATCH PERCUTANEOUS; TOPICAL; TRANSDERMAL at 08:21

## 2021-09-14 RX ADMIN — BACITRACIN: 500 OINTMENT TOPICAL at 08:23

## 2021-09-14 RX ADMIN — FAMOTIDINE 20 MG: 20 TABLET ORAL at 08:22

## 2021-09-14 RX ADMIN — TRAZODONE HYDROCHLORIDE 150 MG: 100 TABLET ORAL at 21:48

## 2021-09-14 RX ADMIN — ENOXAPARIN SODIUM 40 MG: 40 INJECTION SUBCUTANEOUS at 08:21

## 2021-09-14 RX ADMIN — POLYETHYLENE GLYCOL 3350 17 G: 17 POWDER, FOR SOLUTION ORAL at 08:21

## 2021-09-14 RX ADMIN — UMECLIDINIUM BROMIDE AND VILANTEROL TRIFENATATE 1 PUFF: 62.5; 25 POWDER RESPIRATORY (INHALATION) at 08:24

## 2021-09-14 RX ADMIN — OXYCODONE HYDROCHLORIDE 10 MG: 5 TABLET ORAL at 00:34

## 2021-09-14 RX ADMIN — IBUPROFEN 600 MG: 600 TABLET ORAL at 12:44

## 2021-09-14 RX ADMIN — MAGNESIUM HYDROXIDE 30 ML: 400 SUSPENSION ORAL at 21:48

## 2021-09-14 RX ADMIN — ATORVASTATIN CALCIUM 80 MG: 40 TABLET, FILM COATED ORAL at 20:36

## 2021-09-14 RX ADMIN — ACETAMINOPHEN 1000 MG: 500 TABLET, FILM COATED ORAL at 08:22

## 2021-09-14 RX ADMIN — SENNOSIDES AND DOCUSATE SODIUM 3 TABLET: 8.6; 5 TABLET ORAL at 20:36

## 2021-09-14 RX ADMIN — OXYCODONE HYDROCHLORIDE 10 MG: 5 TABLET ORAL at 04:28

## 2021-09-14 RX ADMIN — ACETAMINOPHEN 1000 MG: 500 TABLET, FILM COATED ORAL at 20:36

## 2021-09-14 RX ADMIN — IBUPROFEN 600 MG: 600 TABLET ORAL at 18:33

## 2021-09-14 RX ADMIN — OXYCODONE HYDROCHLORIDE 5 MG: 5 TABLET ORAL at 15:45

## 2021-09-14 RX ADMIN — IBUPROFEN 600 MG: 600 TABLET ORAL at 00:34

## 2021-09-14 RX ADMIN — IBUPROFEN 600 MG: 600 TABLET ORAL at 06:23

## 2021-09-14 RX ADMIN — BUPROPION HYDROCHLORIDE 150 MG: 150 TABLET, FILM COATED, EXTENDED RELEASE ORAL at 08:22

## 2021-09-14 ASSESSMENT — ACTIVITIES OF DAILY LIVING (ADL)
ADLS_ACUITY_SCORE: 16

## 2021-09-14 NOTE — PROGRESS NOTES
[facility]  Medicine Progress Note - Hospitalist Service       Date of Admission:  8/31/2021    Assessment & Plan         Margret Alves is a 76 year old female with PMHx of tobacco use disorder, COPD, insomnia, MDD, polycythemia vera, impaired fasting glucose, and adenocarcinoma RLL admitted on 8/31/2021 and underwent limited right thoracotomy, RL lobectomy with mediastinal LN dissection. Hospitalist service was consulted for medical co-management.      Adenocarcinoma RLL s/p limited right thoracotomy, RL lobectomy with mediastinal LN dissection  Surgery performed 8/31/21 by Dr. Wilcox.   - Routine post-operative cares, IVF, DVT prophylaxis and pain control per primary service. Chest tube in place given ongoing air leak.   - Bowel regimen in place while on narcotics, no BM for days, suppository offered, patient will think about it.       Constipation   Has history of this when in hospital especially.  No abdo pain or distension, also has not eaten usual amount.   - scheduled and as needed bowel regimen in place, suppository if needed, offered.  - last BM 9/7 per chart review, states it is fairly normal for her     Concern of cognitive impairment, per family  - OT consulted.  Family does express some concern with cognitive impairment recently       Increased urinary frequency  Per nursing patient has been urinating every 1-2 hours concerning for UTI.  UA was obtained but not suggestive of UTI   - Monitor       COPD   Mild per recent PFTs per Pulm note 8/24/21.   - Continue PTA Anoro Ellipta and albuterol inhalers      Tobacco use disorder  - Ongoing cessation strongly encouraged     Insomnia  MDD  - PTA Wellbutrin 150 mg po qam  - Trazodone 150 mg at bedtime      Polycythemia vera  Follows with [facility].   - Maintained on Ruxolitinib     Impaired fasting glucose  HgbA1C 8/26/21 5.9.   - No indication for sliding scale insulin      Hypercholesterolemia  - PTA Lipitor 80 mg po  "every day     Diet: Diet  Snacks/Supplements Adult: Other; any preferred suppelment as desired (RD); With Meals  Regular Diet Adult    DVT Prophylaxis: Defer to primary service  Curiel Catheter: Not present  Central Lines: None  Code Status: Full Code      Disposition Plan   Expected discharge: once chest tube is out.      The patient's care was discussed with the Bedside Nurse and Patient.    Rossy Morales MD  Hospitalist Service  [facility]  Securely message with the Vocera Web Console (learn more here)  Text page via [employer] Paging/Directory      Clinically Significant Risk Factors Present on Admission                ______________________________________________________________________    Interval History   Patient seen and examined this morning. Post op pain controlled. Reports no BM for \"few days\" denies abd pain or distension/bloating. No nausea.     Data reviewed today: I reviewed all medications, new labs and imaging results over the last 24 hours. I personally reviewed the chest x-ray image(s) showing small PTX improved, CT in place.    Physical Exam   Vital Signs: Temp: 98.6  F (37  C) Temp src: Oral BP: 109/61 Pulse: 61   Resp: 15 SpO2: 96 % O2 Device: None (Room air)    Weight: 106 lbs 11.24 oz    Gen: NAD, pleasant  HEENT: Normocephalic, EOMI, MMM  Resp: Rt sided chest tube in place with dressing, no crackles, Lt side clear,  no increased work of resp. Surgical incision without gaping or significant erythema.   CV: S1S2 heard, reg rhythm, reg rate, no pedal edema  Abdo: soft, nontender, nondistended, bowel sounds present  Ext: calves nontender, well perfused  Neuro: AAOx3, CN grossly intact, no facial asymmetry      Data   Recent Labs   Lab 09/13/21  0646 09/12/21  0710 09/10/21  0646 09/09/21  0602   PLT  --  746*  --  726*   CR 0.86  --  0.98  --      Recent Results (from the past 24 hour(s))   XR Chest Port 1 View    Narrative    EXAM: XR CHEST PORT 1 VIEW  LOCATION: [facility]  DATE/TIME: 9/14/2021 5:20 AM    INDICATION: post op  COMPARISON: 09/13/2021      Impression    IMPRESSION: Right-sided chest tube remains in place. 1 mm residual right apical pneumothorax. No acute infiltrates. Calcified granulomas redemonstrated. Normal heart size. Extensive subcutaneous emphysema is unchanged.

## 2021-09-14 NOTE — PLAN OF CARE
VSS, ambulating independently in room. CT still in place to WS, intermittent air leak w coughing. Crepitus present. Minimal output this shift. Endorsing high pain rating, given 10 mg oxycodone - along w scheduled tylenol, ibuprofen, hot packs. Pain still at 8/10. 1 time dose 0.2 dilaudid given at shift change. Pt still looking forward to getting chest tube out, discharging home.

## 2021-09-14 NOTE — PROGRESS NOTES
"THORACIC SURGERY POD # 14    S: Patient sitting up in bed, on room air. Episode of increased pain overnight, no obvious trigger. Better this AM. Breathing stable. Crepitus continues to improve. No nausea. No BM for one week per patient. She thinks her bowls will \"go back to normal\" once home, but discussed importance of continued stool softeners while here.     O:  /62   Pulse 73   Temp 97.2  F (36.2  C) (Oral)   Resp 16   Ht 1.511 m (4' 11.49\")   Wt 48.4 kg (106 lb 11.2 oz)   SpO2 95%   BMI 21.20 kg/m    AVSS on room air   Incision: Steri strips in place, some have fallen off but skin remains intact. No signs of infection  CT: new dressing placed personally, stitch remains secure but does appear to be loosening, will monitor  to H2O seal, minimal serosan output, + tidaling, tiny airleak with cough, intermittent airleak with phonation    Final pathology:   3.5x3.1x2.7cm adenocarcinoma right lower lobe lung, no lymph node involvement   Pathological stage pT2a N0 M0, Final Stage IB    A/P: POD 14 s/p right thoracotomy, right lower lobectomy, mediastinal lymph node dissection   --Small airleak persists, but appears to be slowly improving. Continue chest tube to H2O seal. AM CXR ordered   --Will need to monitor tube closely so that it remains secure, may need additional stitch placed this week pending resolution of airleak.   --Increased Senna BID, Miralax daily and scheduled bedtime MOM. Patient needs to have a BM.   --Final pathology as above.       Adele Zepeda PA-C with Dr. Meño Wilcox  [facility]  Cell [phone]      "

## 2021-09-15 ENCOUNTER — APPOINTMENT (OUTPATIENT)
Dept: GENERAL RADIOLOGY | Facility: CLINIC | Age: 76
DRG: 164 | End: 2021-09-15
Attending: PHYSICIAN ASSISTANT
Payer: COMMERCIAL

## 2021-09-15 LAB
PLATELET # BLD AUTO: 748 10E3/UL (ref 150–450)
SARS-COV-2 RNA RESP QL NAA+PROBE: NEGATIVE

## 2021-09-15 PROCEDURE — 250N000009 HC RX 250: Performed by: PHYSICIAN ASSISTANT

## 2021-09-15 PROCEDURE — 120N000001 HC R&B MED SURG/OB

## 2021-09-15 PROCEDURE — 250N000011 HC RX IP 250 OP 636: Performed by: PHYSICIAN ASSISTANT

## 2021-09-15 PROCEDURE — 99232 SBSQ HOSP IP/OBS MODERATE 35: CPT | Performed by: HOSPITALIST

## 2021-09-15 PROCEDURE — 250N000013 HC RX MED GY IP 250 OP 250 PS 637: Performed by: THORACIC SURGERY (CARDIOTHORACIC VASCULAR SURGERY)

## 2021-09-15 PROCEDURE — 36415 COLL VENOUS BLD VENIPUNCTURE: CPT | Performed by: PHYSICIAN ASSISTANT

## 2021-09-15 PROCEDURE — 250N000013 HC RX MED GY IP 250 OP 250 PS 637: Performed by: PHYSICIAN ASSISTANT

## 2021-09-15 PROCEDURE — 71045 X-RAY EXAM CHEST 1 VIEW: CPT

## 2021-09-15 PROCEDURE — 85049 AUTOMATED PLATELET COUNT: CPT | Performed by: PHYSICIAN ASSISTANT

## 2021-09-15 RX ADMIN — ENOXAPARIN SODIUM 40 MG: 40 INJECTION SUBCUTANEOUS at 08:59

## 2021-09-15 RX ADMIN — FAMOTIDINE 20 MG: 20 TABLET ORAL at 08:51

## 2021-09-15 RX ADMIN — BACITRACIN: 500 OINTMENT TOPICAL at 08:54

## 2021-09-15 RX ADMIN — MAGNESIUM HYDROXIDE 30 ML: 400 SUSPENSION ORAL at 20:34

## 2021-09-15 RX ADMIN — OXYCODONE HYDROCHLORIDE 5 MG: 5 TABLET ORAL at 05:50

## 2021-09-15 RX ADMIN — ACETAMINOPHEN 1000 MG: 500 TABLET, FILM COATED ORAL at 20:36

## 2021-09-15 RX ADMIN — ACETAMINOPHEN 1000 MG: 500 TABLET, FILM COATED ORAL at 15:01

## 2021-09-15 RX ADMIN — TRAZODONE HYDROCHLORIDE 150 MG: 100 TABLET ORAL at 20:37

## 2021-09-15 RX ADMIN — IBUPROFEN 600 MG: 600 TABLET ORAL at 12:55

## 2021-09-15 RX ADMIN — ACETAMINOPHEN 1000 MG: 500 TABLET, FILM COATED ORAL at 08:51

## 2021-09-15 RX ADMIN — LIDOCAINE 1 PATCH: 560 PATCH PERCUTANEOUS; TOPICAL; TRANSDERMAL at 08:52

## 2021-09-15 RX ADMIN — BUPROPION HYDROCHLORIDE 150 MG: 150 TABLET, FILM COATED, EXTENDED RELEASE ORAL at 08:51

## 2021-09-15 RX ADMIN — IBUPROFEN 600 MG: 600 TABLET ORAL at 18:03

## 2021-09-15 RX ADMIN — ATORVASTATIN CALCIUM 80 MG: 40 TABLET, FILM COATED ORAL at 20:38

## 2021-09-15 RX ADMIN — UMECLIDINIUM BROMIDE AND VILANTEROL TRIFENATATE 1 PUFF: 62.5; 25 POWDER RESPIRATORY (INHALATION) at 09:02

## 2021-09-15 ASSESSMENT — ACTIVITIES OF DAILY LIVING (ADL)
ADLS_ACUITY_SCORE: 16

## 2021-09-15 NOTE — PLAN OF CARE
[id number]: Patient alert and oriented, VSS on room air. Up independently, ambulated in [place]on. Complains of pain in R back, scheduled advil given. CT clamped. LS diminished, clear. Possible discharge tomorrow.

## 2021-09-15 NOTE — PLAN OF CARE
VSS. A/O. Ind. CT intermittent leaks with cough, crepitus getting better on back n chest area. Oxy/tylenol given. Tolerating diet. Voiding own.

## 2021-09-15 NOTE — PLAN OF CARE
8110-0104  A&O x4, VSS. Pt up independently. CT in place, set to water seal with intermittent air leak when coughing. Crepitus noted, but getting better. Pain controlled with oxycodone, tylenol, and ibuprofen. Voiding adequately. Tolerating diet.

## 2021-09-15 NOTE — PROGRESS NOTES
"THORACIC SURGERY POD # 15    S: Patient sitting up in bed, on room air. Doing well today, spirits somewhat lower. Remains anxious to discharge. Pain in control. Breathing stable. No nausea, + flatus but still NO BM for last 8 days. Encouraged use of stool softeners and suppository vs enema (patient initially refusing earlier today).     O:  /69 (BP Location: Right arm)   Pulse 89   Temp 98.3  F (36.8  C) (Oral)   Resp 16   Ht 1.511 m (4' 11.49\")   Wt 48.4 kg (106 lb 11.2 oz)   SpO2 95%   BMI 21.20 kg/m    AVSS on room air   Incision: Steri strips in place, some have fallen off but skin remains intact. No signs of infection  CT: Site examined, suture is loosening but tube secure for now, occlusive dressing in place, + tidaling but no airleak noted with repeated strong coughs    CXR 9/15/2021:   1.  No convincing change since the recent comparison study.  2.  A right pleural drain is in place. No visualized pneumothorax.  3.  Moderately extensive subcutaneous emphysema throughout the chest again noted.     Final pathology:   3.5x3.1x2.7cm adenocarcinoma right lower lobe lung, no lymph node involvement   Pathological stage pT2a N0 M0, Final Stage IB    A/P: POD 15 s/p right thoracotomy, right lower lobectomy, mediastinal lymph node dissection   --No airleak with strong coughs! Chest tube clamped personally, leave clamped through tomorrow AM CXR for extended clamp trial.   --Tube stitch is loosening at skin. Instructed patient to monitor tubing closely. Occlusive dressing in place.   --Work on bowels, encouraged use of stool softeners and enema vs suppository   --Pending AM CXR, hopeful for potential chest tube removal tomorrow and discharge home. If patient fails clamp trial, will need to place additional stitch at chest tube site.   --Given thrombocytosis, medicine planning to start aspirin following chest tube removal. Ok from surgery perspective.     GODFREY Grady will plan to see patient tomorrow AM. "     Adele Zepeda PA-C with Dr. [facility]  Cell [phone]

## 2021-09-15 NOTE — PROGRESS NOTES
[facility]  Medicine Progress Note - Hospitalist Service       Date of Admission:  8/31/2021    Assessment & Plan         Margret Alves is a 76 year old female with PMHx of tobacco use disorder, COPD, insomnia, MDD, polycythemia vera, impaired fasting glucose, and adenocarcinoma RLL admitted on 8/31/2021 and underwent limited right thoracotomy, RL lobectomy with mediastinal LN dissection. Hospitalist service was consulted for medical co-management.      Adenocarcinoma RLL s/p limited right thoracotomy, RL lobectomy with mediastinal LN dissection  Surgery performed 8/31/21 by Dr. Wilcox.   - Routine post-operative cares, IVF, DVT prophylaxis and pain control per primary service. Chest tube in place given ongoing air leak.   - Bowel regimen in place while on narcotics, no BM for days, suppository offered, patient will think about it.       Constipation   Has history of this when in hospital especially.  No abdo pain or distension, also has not eaten usual amount.   - scheduled and as needed bowel regimen in place, suppository if needed, offered.  - last BM 9/7 per chart review, states it is fairly normal for her  - multiple meds available but patient does not want per RN. Agreed for suppository x1 today, discussed wtih RN.      Concern of cognitive impairment, per family  - OT consulted.  Family does express some concern with cognitive impairment recently       Increased urinary frequency  Per nursing patient has been urinating every 1-2 hours concerning for UTI.  UA was obtained but not suggestive of UTI   - Monitor       COPD   Mild per recent PFTs per Pulm note 8/24/21.   - Continue PTA Anoro Ellipta and albuterol inhalers      Tobacco use disorder  - Ongoing cessation strongly encouraged     Insomnia  MDD  - PTA Wellbutrin 150 mg po qam  - Trazodone 150 mg at bedtime      Polycythemia vera  Thrombocytosis  Followed by Dr Silva from [facility].   - given worsening thrombocytosis,  discussed with BARBI Lafleur from [facility]. Recommending ASA 81 mg daily on discharge and recheck CBC in a week from discharge. Maintained on Ruxolitinib, continued.       Impaired fasting glucose  HgbA1C 8/26/21 5.9.   - No indication for sliding scale insulin      Hypercholesterolemia  - PTA Lipitor 80 mg po every day     Diet: Diet  Snacks/Supplements Adult: Other; any preferred suppelment as desired (RD); With Meals  Regular Diet Adult    DVT Prophylaxis: Defer to primary service  Curiel Catheter: Not present  Central Lines: None  Code Status: Full Code      Disposition Plan   Expected discharge: once chest tube is out, likely tomorrow per primary service.      The patient's care was discussed with the Bedside Nurse and Patient.    Rossy Morales MD  Hospitalist Service  [facility]  Securely message with the Vocera Web Console (learn more here)  Text page via [employer] Paging/Directory      Clinically Significant Risk Factors Present on Admission                ______________________________________________________________________    Interval History   No acute issues, no BM for days but per RN, did not want meds from the bowel regimen. Denies abd pain or distension/bloating. No nausea. Agreed for suppository.   -Post op pain controlled.     Data reviewed today: I reviewed all medications, new labs and imaging results over the last 24 hours. I personally reviewed the chest x-ray image(s) showing  no PTX noted. .    Physical Exam   Vital Signs: Temp: 98.3  F (36.8  C) Temp src: Oral BP: 129/69 Pulse: 89   Resp: 16 SpO2: 95 % O2 Device: None (Room air)    Weight: 106 lbs 11.24 oz    Gen: NAD, pleasant  HEENT: Normocephalic, EOMI, MMM  Resp: Rt sided chest tube in place with dressing, no crackles, Lt side clear,  no increased work of resp. Surgical incision without gaping or significant erythema.   CV: S1S2 heard, reg rhythm, reg rate, no pedal edema  Abdo: soft, nontender, nondistended, bowel  sounds present  Ext: calves nontender, well perfused  Neuro: AAOx3, CN grossly intact, no facial asymmetry      Data   Recent Labs   Lab 09/15/21  0641 09/13/21  0646 09/12/21  0710 09/10/21  0646 09/09/21  0602   *  --  746*  --  726*   CR  --  0.86  --  0.98  --      Recent Results (from the past 24 hour(s))   XR Chest Port 1 View    Narrative    EXAM: CHEST SINGLE VIEW PORTABLE  LOCATION: [facility]  DATE/TIME: 09/15/2021, 5:49 AM    INDICATION: Postoperative.  COMPARISON: 09/14/2021 at 0527 hours.      Impression    IMPRESSION:   1.  No convincing change since the recent comparison study.  2.  A right pleural drain is in place. No visualized pneumothorax.  3.  Moderately extensive subcutaneous emphysema throughout the chest again noted.

## 2021-09-15 NOTE — PLAN OF CARE
"VSS.  Pt is up independently in her room.  Chest tube intact and continues to have an air leak, MD/PA aware.  Pt taking Ibuprofen and Tylenol for pain.  CT now clamped per PA orders.  Pt states she is ready to go home and \"bored here\".  Had had a BM this afternoon.  "

## 2021-09-16 ENCOUNTER — APPOINTMENT (OUTPATIENT)
Dept: GENERAL RADIOLOGY | Facility: CLINIC | Age: 76
DRG: 164 | End: 2021-09-16
Attending: PHYSICIAN ASSISTANT
Payer: COMMERCIAL

## 2021-09-16 VITALS
HEART RATE: 74 BPM | TEMPERATURE: 97.6 F | HEIGHT: 59 IN | OXYGEN SATURATION: 96 % | BODY MASS INDEX: 21.51 KG/M2 | RESPIRATION RATE: 14 BRPM | WEIGHT: 106.7 LBS | DIASTOLIC BLOOD PRESSURE: 60 MMHG | SYSTOLIC BLOOD PRESSURE: 104 MMHG

## 2021-09-16 LAB
CREAT SERPL-MCNC: 0.9 MG/DL (ref 0.52–1.04)
GFR SERPL CREATININE-BSD FRML MDRD: 63 ML/MIN/1.73M2

## 2021-09-16 PROCEDURE — 250N000013 HC RX MED GY IP 250 OP 250 PS 637: Performed by: PHYSICIAN ASSISTANT

## 2021-09-16 PROCEDURE — 250N000013 HC RX MED GY IP 250 OP 250 PS 637: Performed by: THORACIC SURGERY (CARDIOTHORACIC VASCULAR SURGERY)

## 2021-09-16 PROCEDURE — 71045 X-RAY EXAM CHEST 1 VIEW: CPT

## 2021-09-16 PROCEDURE — 250N000011 HC RX IP 250 OP 636: Performed by: PHYSICIAN ASSISTANT

## 2021-09-16 PROCEDURE — 99232 SBSQ HOSP IP/OBS MODERATE 35: CPT | Performed by: HOSPITALIST

## 2021-09-16 PROCEDURE — 36415 COLL VENOUS BLD VENIPUNCTURE: CPT | Performed by: THORACIC SURGERY (CARDIOTHORACIC VASCULAR SURGERY)

## 2021-09-16 PROCEDURE — 82565 ASSAY OF CREATININE: CPT | Performed by: THORACIC SURGERY (CARDIOTHORACIC VASCULAR SURGERY)

## 2021-09-16 PROCEDURE — G0452 MOLECULAR PATHOLOGY INTERPR: HCPCS | Mod: 26 | Performed by: PATHOLOGY

## 2021-09-16 RX ORDER — OXYCODONE HYDROCHLORIDE 5 MG/1
5-10 TABLET ORAL 2 TIMES DAILY PRN
Qty: 12 TABLET | Refills: 0 | Status: SHIPPED | OUTPATIENT
Start: 2021-09-16

## 2021-09-16 RX ADMIN — UMECLIDINIUM BROMIDE AND VILANTEROL TRIFENATATE 1 PUFF: 62.5; 25 POWDER RESPIRATORY (INHALATION) at 09:38

## 2021-09-16 RX ADMIN — ACETAMINOPHEN 1000 MG: 500 TABLET, FILM COATED ORAL at 09:36

## 2021-09-16 RX ADMIN — ENOXAPARIN SODIUM 40 MG: 40 INJECTION SUBCUTANEOUS at 09:36

## 2021-09-16 RX ADMIN — BUPROPION HYDROCHLORIDE 150 MG: 150 TABLET, FILM COATED, EXTENDED RELEASE ORAL at 09:35

## 2021-09-16 RX ADMIN — FAMOTIDINE 20 MG: 20 TABLET ORAL at 09:36

## 2021-09-16 RX ADMIN — OXYCODONE HYDROCHLORIDE 5 MG: 5 TABLET ORAL at 03:29

## 2021-09-16 RX ADMIN — BACITRACIN: 500 OINTMENT TOPICAL at 09:37

## 2021-09-16 ASSESSMENT — ACTIVITIES OF DAILY LIVING (ADL)
ADLS_ACUITY_SCORE: 16

## 2021-09-16 NOTE — PROGRESS NOTES
"Thoracic Surgery POD #16:  /60 (BP Location: Right arm)   Pulse 74   Temp 97.6  F (36.4  C) (Oral)   Resp 14   Ht 1.511 m (4' 11.49\")   Wt 48.4 kg (106 lb 11.2 oz)   SpO2 96%   BMI 21.20 kg/m    CXR: no PTX with prolonged CT clamping trial, same subcutaneous emphysema bilaterally  CT: minimal serous output    S: Tolerated prolonged CT clamping trail without dyspnea nor increased pain. Had a BM. Pain much less than immediately postop. Fully instructed on wound care, pain management and follow up.  O: Inc: a few steri strips remain, no erythema, dry, well-healed skin edges  CT: no air leak with multiple coughs, minimal serous output  P: OK to discharge home today  OK to restart baby ASA  See me with CXR on Sept. 27    Ysabel Dockery PA-C with Dr. [facility]  Cell [phone]        "

## 2021-09-16 NOTE — PROGRESS NOTES
[facility]  Medicine Progress Note - Hospitalist Service       Date of Admission:  8/31/2021    Assessment & Plan         Margret Alves is a 76 year old female with PMHx of tobacco use disorder, COPD, insomnia, MDD, polycythemia vera, impaired fasting glucose, and adenocarcinoma RLL admitted on 8/31/2021 and underwent limited right thoracotomy, RL lobectomy with mediastinal LN dissection. Hospitalist service was consulted for medical co-management.      Adenocarcinoma RLL s/p limited right thoracotomy, RL lobectomy with mediastinal LN dissection  Surgery performed 8/31/21 by Dr. Wilcox.   - Routine post-operative cares, IVF, DVT prophylaxis and pain control per primary service. Chest tube in place given ongoing air leak.   - Bowel regimen in place while on narcotics, no BM for days, suppository offered, patient will think about it.       Constipation   Has history of this when in hospital especially.  No abdo pain or distension, also has not eaten usual amount.   - scheduled and as needed bowel regimen in place, suppository if needed, offered.   - multiple medications were made available. Had BM 9/15 per RN.     Concern of cognitive impairment, per family  - OT consulted.  Family does express some concern with cognitive impairment recently       Increased urinary frequency  Per nursing patient has been urinating every 1-2 hours concerning for UTI.  UA was obtained but not suggestive of UTI, resolved.          COPD   Mild per recent PFTs per Pulm note 8/24/21.   - Continue PTA Anoro Ellipta and albuterol inhalers      Tobacco use disorder  - Ongoing cessation strongly encouraged     Insomnia  MDD  - PTA Wellbutrin 150 mg po qam  - Trazodone 150 mg at bedtime      Polycythemia vera  Thrombocytosis  Followed by Dr Silva from [facility].   - given worsening thrombocytosis, discussed with BARBI Lafleur from [facility]. Recommending ASA 81 mg daily on discharge and recheck CBC in a week from  discharge. Maintained on Ruxolitinib, continued.       Impaired fasting glucose  HgbA1C 8/26/21 5.9.   - No indication for sliding scale insulin      Hypercholesterolemia  - PTA Lipitor 80 mg po every day     Diet: Diet    DVT Prophylaxis: Defer to primary service  Curiel Catheter: Not present  Central Lines: None  Code Status:        Disposition Plan   Expected discharge: once chest tube removed, today per primary service. Discharge medications reviewed and completed for chronic medical conditions.      The patient's care was discussed with the Bedside Nurse and Patient.    Rossy Morales MD  Hospitalist Service  [facility]  Securely message with the [employer]oc[employer] Web Console (learn more here)  Text page via [employer] Paging/Directory      Clinically Significant Risk Factors Present on Admission                ______________________________________________________________________    Interval History     Shunt was evaluated this morning, eager to be discharged.  Reported BM yesterday, denies abdominal discomfort.  Pain at the chest tube site is controlled.     Data reviewed today: I reviewed all medications, new labs and imaging results over the last 24 hours. I personally reviewed chest x-ray from this morning which shows no definitive pneumothorax on right.    Physical Exam   Vital Signs: Temp: 97.6  F (36.4  C) Temp src: Oral BP: 104/60 Pulse: 74   Resp: 14 SpO2: 96 % O2 Device: None (Room air)    Weight: 106 lbs 11.24 oz    Gen: NAD, pleasant  HEENT: Normocephalic, EOMI, MMM  Resp: Rt sided chest tube in place with dressing, no crackles, Lt side clear,  no increased work of resp. Surgical incision without gaping or significant erythema.   CV: S1S2 heard, reg rhythm, reg rate, no pedal edema  Abdo: soft, nontender, nondistended, bowel sounds present  Ext: calves nontender, well perfused  Neuro: AAOx3, CN grossly intact, no facial asymmetry      Data   Recent Labs   Lab 09/16/21  0721  09/15/21  0641 09/13/21  0646 09/12/21  0710 09/10/21  0646   PLT  --  748*  --  746*  --    CR 0.90  --  0.86  --  0.98     Recent Results (from the past 24 hour(s))   XR Chest Port 1 View    Narrative    CHEST ONE VIEW PORTABLE   9/16/2021 5:47 AM     HISTORY:  Postop.    COMPARISON: Chest x-ray 9/15/2021.      Impression    IMPRESSION: Portable chest. Calcified granuloma left upper lobe and  calcified left hilar lymph nodes are again noted. Lungs are otherwise  clear. Right chest tube remains unchanged in position. No definite  evidence of pneumothorax. No pleural effusions. Subcutaneous emphysema  noted along the right and left lateral chest walls and in the  supraclavicular regions bilaterally.    LINUS ASHFORD MD         SYSTEM ID:  [id number]

## 2021-09-16 NOTE — PLAN OF CARE
A&Ox4, VSS on RA. Independent. Pain controlled with Tylenol, PRN oxycodone. Chest tube removed in the AM by provider. Some drainage from chest tube site, dressing change done. Instructed pt on how to do dressing changes at home. LS clear. Pt cleared for discharge. Discharge information provided. Pt verbalized understanding. Pt went home with daughter.

## 2021-09-16 NOTE — PROGRESS NOTES
Patient refused VS at 2300 but allowed assessment of CT and lungs. A/Ox4. Incisions CDI. Patient refused Tylenol and ibuprofen, accepted oxycodone and V/S at that time. Up independently. Diet regular. BS not assessed. Flatus positive. Voiding frequently per patient. LS diminished but clear. CT clamped at report; unclamped at 0645.

## 2024-09-18 ENCOUNTER — APPOINTMENT (OUTPATIENT)
Dept: URBAN - METROPOLITAN AREA CLINIC 257 | Age: 79
Setting detail: DERMATOLOGY
End: 2024-09-30

## 2024-09-18 VITALS — HEIGHT: 60 IN | WEIGHT: 120 LBS

## 2024-09-18 DIAGNOSIS — L57.8 OTHER SKIN CHANGES DUE TO CHRONIC EXPOSURE TO NONIONIZING RADIATION: ICD-10-CM

## 2024-09-18 DIAGNOSIS — L82.1 OTHER SEBORRHEIC KERATOSIS: ICD-10-CM

## 2024-09-18 DIAGNOSIS — L65.9 NONSCARRING HAIR LOSS, UNSPECIFIED: ICD-10-CM

## 2024-09-18 PROCEDURE — OTHER COUNSELING: OTHER

## 2024-09-18 PROCEDURE — OTHER MIPS QUALITY: OTHER

## 2024-09-18 PROCEDURE — OTHER SUNSCREEN RECOMMENDATIONS: OTHER

## 2024-09-18 PROCEDURE — 99203 OFFICE O/P NEW LOW 30 MIN: CPT

## 2024-09-18 ASSESSMENT — LOCATION DETAILED DESCRIPTION DERM
LOCATION DETAILED: RIGHT INFERIOR MEDIAL FOREHEAD
LOCATION DETAILED: POSTERIOR MID-PARIETAL SCALP
LOCATION DETAILED: LEFT MEDIAL FRONTAL SCALP

## 2024-09-18 ASSESSMENT — LOCATION ZONE DERM
LOCATION ZONE: FACE
LOCATION ZONE: SCALP

## 2024-09-18 ASSESSMENT — LOCATION SIMPLE DESCRIPTION DERM
LOCATION SIMPLE: RIGHT FOREHEAD
LOCATION SIMPLE: POSTERIOR SCALP
LOCATION SIMPLE: LEFT SCALP

## 2024-09-18 NOTE — PROCEDURE: MIPS QUALITY
Detail Level: Generalized
Quality 226: Preventive Care And Screening: Tobacco Use: Screening And Cessation Intervention: Patient screened for tobacco use and is an ex/non-smoker
Quality 226: Preventive Care And Screening: Tobacco Use: Screening And Cessation Intervention: Patient screened for tobacco use, is a smoker AND did not receive Cessation Counseling during measurement period or in the six months prior to the measurement period

## 2024-09-18 NOTE — HPI: SKIN LESION
What Type Of Note Output Would You Prefer (Optional)?: Standard Output
Has Your Skin Lesion Been Treated?: not been treated
Is This A New Presentation, Or A Follow-Up?: Skin Lesions
Additional History: Patient reports concern of lesions on her scalp that appeared 1 month ago. Patient states that they are raised and bothersome. Patient presents for further evaluation.
Which Family Member (Optional)?: Both children

## 2024-10-16 NOTE — DISCHARGE SUMMARY
THORACIC SURGERY HOSPITAL DISCHARGE SUMMARY  [facility] ONCOLOGY - THORACIC SURGERY  [address], [address]  [place] [address]  Phone [phone]  [email or web address]    9/17/2021     Мария Chapa   [facility] [address] [address] [facility] [address]  / ARABELLA PETTY  Phone: [phone]   Fax: [phone]        Re: Margret Alves             1945             [id number]              Dates of Hospitalization: 8/31/2021 - 9/16/2021   Date of Service (when I saw the patient):9/16/21    Dear Dr. Chapa:     As you are aware, we had the pleasure of caring for your patient,  Margret Alves here at [facility].  She is a 76-year-old woman was found to have an enlarging nodule.  PET scan did not show evidence of mayur disease or distant disease.  CT-guided biopsy was positive for adenocarcinoma.  Clinically, this is stage I.  MRI of the brain was negative and her pulmonary function tests are adequate.  Based on the findings, a resection is indicated for treatment.    On 8/31/2021, Dr. Meño Wilcox performed the following:    Procedure/Surgery Information   Procedure: Limited right thoracotomy, right lower lobectomy with mediastinal lymph node dissection.   Surgeon(s): Surgeon(s) and Role:     * Meño Wilcox MD - Primary     * Ysabel Dockery PA-C - Assisting   Specimens: ID Type Source Tests Collected by Time Destination   1 : LYMPH NODE 9 INFERIOR PULMONARY LIGAMENT Tissue Lymph Node(s) SURGICAL PATHOLOGY EXAM Meño Wilcox MD 8/31/2021  8:26 AM    2 : LYMPH NODE 7 SUBCARINAL Tissue Lymph Node(s) SURGICAL PATHOLOGY EXAM Meño Wilcox MD 8/31/2021  8:29 AM    3 : LYMPH NODE 4R  RIGHT LOWER PARATRACHEAL Tissue Lymph Node(s), Paratracheal, Right SURGICAL PATHOLOGY EXAM Meño Wilcox MD 8/31/2021  8:32 AM    4 : LYMPH NODE 11 INTERLOBAR Tissue Lymph Node(s) SURGICAL PATHOLOGY EXAM Meño Wilcox  MD [place] 8/31/2021  8:42 AM    5 : LYMPH NODE 11 INTERLOBAR SUMP Tissue Lymph Node(s) SURGICAL PATHOLOGY EXAM Meño Wilcox MD 8/31/2021  8:49 AM    6 : LYMPH NODE 12 RIGHT LOWER LOBE BRONCHUS  Tissue Lymph Node(s) SURGICAL PATHOLOGY EXAM Meño Wilcox MD 8/31/2021  8:55 AM    7 : RIGHT LOWER LOBE LUNG Tissue Lung, Lower Lobe, Right SURGICAL PATHOLOGY EXAM Meño Wilcox MD 8/31/2021  8:56 AM             Final Surgical Pathology Revealed:  3.5 cm well differentiated invasive papillary predominant adenocarcinoma with benign surgical margins. DIO is present. All mediastinal lymph nodes are benign. Final pathologic stage [id number], Final Stage IB.    Her post-operative course was remarkable for a prolonged pulmonary postoperative air leak, which necessitated prolonged chest tube to suction.       Consultations This Hospital Stay   HOSPITALIST IP CONSULT  OCCUPATIONAL THERAPY ADULT IP CONSULT    Margret Alves has otherwise recovered sufficiently to be discharged to home today, 9/16/2021, on post-operative day number sixteen for further convalescence.  Her incisions are healing well with no signs or symptoms of infection.  Her bowels have moved sufficiently and she is tolerating diet and activity, ambulating and transferring independently.  She is currently afebrile with stable vital signs.     Below, you will find a full discharge medication list and instructions.  We have arranged for Margret Alves to follow-up with us in our [facility] in 7-10 days with a Chest X-ray prior to that appointment.  We thank you for allowing us to participate in the care of Margret Alves here at [facility].  Please feel free to contact our office at [phone] with any questions or concerns or if we can be of any further assistance in the care of this patient.    Sincerely,    Dr. Meño Wilcox MD    D/C Summary Prepared by: Ysabel Dockery PA-C    Discharge  Medications:  Discharge Medication List as of 9/16/2021 10:08 AM      START taking these medications    Details   acetaminophen (TYLENOL) 500 MG tablet Take 2 tablets (1,000 mg) by mouth 4 times daily, Disp-100 tablet, R-0, E-Prescribe      aspirin (ASA) 81 MG EC tablet Take 1 tablet (81 mg) by mouth daily, Disp-30 tablet, R-0, E-PrescribeFuture refills by PCP Dr. Мария Chapa with phone number [phone].      ibuprofen (ADVIL/MOTRIN) 600 MG tablet Take 1 tablet (600 mg) by mouth 4 times daily, Disp-100 tablet, R-0, E-Prescribe      oxyCODONE (ROXICODONE) 5 MG tablet Take 1-2 tablets (5-10 mg) by mouth 2 times daily as needed for moderate to severe pain, Disp-12 tablet, R-0, E-Prescribe         CONTINUE these medications which have NOT CHANGED    Details   albuterol (PROAIR HFA/PROVENTIL HFA/VENTOLIN HFA) 108 (90 Base) MCG/ACT inhaler Inhale 2 puffs into the lungs every 6 hours, Historical      atorvastatin (LIPITOR) 80 MG tablet Take 80 mg by mouth daily, Historical      buPROPion (WELLBUTRIN XL) 150 MG 24 hr tablet Take 150 mg by mouth every morning, Historical      nicotine (NICORETTE) 2 MG gum Place 2 mg inside cheek as needed for smoking cessation, Historical      ruxolitinib 10 MG TABS tablet Take 10 mg by mouth 2 times daily , Historical      traZODone (DESYREL) 150 MG tablet Take 150 mg by mouth At Bedtime, Historical      umeclidinium-vilanterol (ANORO ELLIPTA) 62.5-25 MCG/INH oral inhaler Inhale 1 puff into the lungs daily, Historical         STOP taking these medications       HYDROmorphone (DILAUDID) 2 MG tablet Comments:   Reason for Stopping:         senna-docusate (SENOKOT-S/PERICOLACE) 8.6-50 MG tablet Comments:   Reason for Stopping:                 Discharge Instructions:  1) Remove chest tube dressing on 9/18/21 and then it is Ok to shower.  Please wash both incision and chest tube site daily with soap and water.  You may cover the chest tube site daily with a clean band-aid or dry  gauze if it continues to drain.  Once it stops draining, leave the site open to air and it will form a scab.  2) Steri-strips can be removed in 1 week or they will fall off when they are ready.  3) Continue daily use of your Incentive Spirometer, set of 10x in a row, every 1-2 hours while you are awake during the day. Also use your flutter valve if you received one during your stay.   4) No lifting, pushing or pulling 10 lbs for 4 weeks from the day of your surgery.  No driving while on narcotic pain medications.    Follow-Up Care:  1) Follow up with Ysabel Dockery PA-C/Dr. Wilcox at the [place] [facility] in [place] ([address], [address], [place] [address]).  Call [facility]a at [phone] to schedule the appointment.  2) Follow up with Primary Care Provider, Мария Chapa within 1 month of discharge for routine post-surgical care, wound check and follow up.  Please call [phone] to arrange this appointment.       CC  Patient Care Team:  Мария Chapa MD as PCP - General (Family Medicine)       mammogram

## (undated) DEVICE — SOL NACL 0.9% IRRIG 1000ML BOTTLE 2F7124

## (undated) DEVICE — DRAPE BREAST/CHEST 29420

## (undated) DEVICE — LINEN GOWN OVERSIZE 5408

## (undated) DEVICE — DRSG KERLIX 4 1/2"X4YDS ROLL 6715

## (undated) DEVICE — DRAPE IOBAN INCISE 23X17" 6650EZ

## (undated) DEVICE — SUCTION CANISTER MEDIVAC LINER 3000ML W/LID 65651-530

## (undated) DEVICE — CLIP APPLIER 11" MED LIGACLIP MCM20

## (undated) DEVICE — SU PROLENE 4-0 V-7DA 36" 8975H

## (undated) DEVICE — SU SILK 2-0 TIE 24" SA75H

## (undated) DEVICE — SU VICRYL 1 CTX CR 8X18" J765D

## (undated) DEVICE — TUBING SUCTION MEDI-VAC SOFT 3/16"X20' N520A

## (undated) DEVICE — DRAPE POUCH INSTRUMENT 1018

## (undated) DEVICE — GLOVE PROTEXIS W/NEU-THERA 6.5  2D73TE65

## (undated) DEVICE — GLOVE PROTEXIS W/NEU-THERA 7.5  2D73TE75

## (undated) DEVICE — Device

## (undated) DEVICE — PACK MINOR SBA15MIFSE

## (undated) DEVICE — SPONGE LAP 18X18" X8435

## (undated) DEVICE — SU NDL CUT REV MED 3/8 209014

## (undated) DEVICE — SURGICEL HEMOSTAT 3X4" NUKNIT 1943

## (undated) DEVICE — SYR BULB IRRIG 50ML LATEX FREE 0035280

## (undated) DEVICE — PREP CHLORAPREP 26ML TINTED ORANGE  260815

## (undated) DEVICE — ESU PENCIL W/HOLSTER E2350H

## (undated) DEVICE — STPL ENDO ARTICULATING 60MM EC60A

## (undated) DEVICE — SOL WATER IRRIG 1000ML BOTTLE 2F7114

## (undated) DEVICE — DRAIN CHEST TUBE 28FR STR 8028

## (undated) DEVICE — STPL RELOAD REG/THK TISSUE ECHELON 60 X 3.8MM GOLD GST60D

## (undated) DEVICE — CATH ON-Q PAIN SILVER SKR 2.5" PM010-A

## (undated) DEVICE — GOWN IMPERVIOUS ZONED LG

## (undated) DEVICE — SU PROLENE 3-0 V-7DA 36" 8976H

## (undated) DEVICE — STPL POWERED ECHELON VASC 35MM PVE35A

## (undated) DEVICE — SU VICRYL 1 CT 36" J959H

## (undated) DEVICE — STPL SKIN SUBCUTICULAR INSORB  2030

## (undated) DEVICE — LINEN TOWEL PACK X5 5464

## (undated) DEVICE — BLADE KNIFE SURG 10 371110

## (undated) DEVICE — DRSG GAUZE 4X4" 3033

## (undated) DEVICE — ESU GROUND PAD UNIVERSAL W/O CORD

## (undated) DEVICE — NDL 22GA 1.5"

## (undated) DEVICE — TUBING SUCTION 12"X1/4" N612

## (undated) DEVICE — SU SILK 2 REEL 60" SA8H

## (undated) DEVICE — BLADE KNIFE SURG 15 371115

## (undated) DEVICE — SUCTION DRY CHEST DRAIN OASIS 3600-100

## (undated) DEVICE — SU VICRYL 2-0 CT-1 27" J339H

## (undated) DEVICE — ESU ELEC BLADE 6" COATED/INSULATED E1455-6

## (undated) DEVICE — DRSG STERI STRIP 1/2X4" R1547

## (undated) RX ORDER — PROPOFOL 10 MG/ML
INJECTION, EMULSION INTRAVENOUS
Status: DISPENSED
Start: 2021-08-31

## (undated) RX ORDER — HYDROMORPHONE HCL IN WATER/PF 6 MG/30 ML
PATIENT CONTROLLED ANALGESIA SYRINGE INTRAVENOUS
Status: DISPENSED
Start: 2021-08-31

## (undated) RX ORDER — DEXAMETHASONE SODIUM PHOSPHATE 4 MG/ML
INJECTION, SOLUTION INTRA-ARTICULAR; INTRALESIONAL; INTRAMUSCULAR; INTRAVENOUS; SOFT TISSUE
Status: DISPENSED
Start: 2021-08-31

## (undated) RX ORDER — FENTANYL CITRATE 50 UG/ML
INJECTION, SOLUTION INTRAMUSCULAR; INTRAVENOUS
Status: DISPENSED
Start: 2021-08-31

## (undated) RX ORDER — ONDANSETRON 2 MG/ML
INJECTION INTRAMUSCULAR; INTRAVENOUS
Status: DISPENSED
Start: 2021-08-31

## (undated) RX ORDER — LIDOCAINE HYDROCHLORIDE 20 MG/ML
INJECTION, SOLUTION EPIDURAL; INFILTRATION; INTRACAUDAL; PERINEURAL
Status: DISPENSED
Start: 2021-08-31

## (undated) RX ORDER — BUPIVACAINE HYDROCHLORIDE 5 MG/ML
INJECTION, SOLUTION EPIDURAL; INTRACAUDAL
Status: DISPENSED
Start: 2021-08-31

## (undated) RX ORDER — FENTANYL CITRATE 0.05 MG/ML
INJECTION, SOLUTION INTRAMUSCULAR; INTRAVENOUS
Status: DISPENSED
Start: 2021-08-31

## (undated) RX ORDER — ALBUTEROL SULFATE 90 UG/1
AEROSOL, METERED RESPIRATORY (INHALATION)
Status: DISPENSED
Start: 2021-08-31